# Patient Record
Sex: FEMALE | Race: BLACK OR AFRICAN AMERICAN | Employment: FULL TIME | URBAN - METROPOLITAN AREA
[De-identification: names, ages, dates, MRNs, and addresses within clinical notes are randomized per-mention and may not be internally consistent; named-entity substitution may affect disease eponyms.]

---

## 2017-01-10 ENCOUNTER — OFFICE VISIT (OUTPATIENT)
Dept: OBGYN CLINIC | Age: 27
End: 2017-01-10

## 2017-01-10 VITALS
WEIGHT: 193 LBS | BODY MASS INDEX: 32.95 KG/M2 | SYSTOLIC BLOOD PRESSURE: 118 MMHG | HEIGHT: 64 IN | DIASTOLIC BLOOD PRESSURE: 80 MMHG

## 2017-01-10 DIAGNOSIS — Z32.01 POSITIVE PREGNANCY TEST: ICD-10-CM

## 2017-01-10 DIAGNOSIS — N92.6 MISSED MENSES: Primary | ICD-10-CM

## 2017-01-10 DIAGNOSIS — O09.291 H/O MISCARRIAGE, CURRENTLY PREGNANT, FIRST TRIMESTER: ICD-10-CM

## 2017-01-10 LAB
HCG URINE, QL. (POC): POSITIVE
VALID INTERNAL CONTROL?: YES

## 2017-01-10 NOTE — PROGRESS NOTES
[facility]  [email or web address]  [phone]    Asa Galdamez MD, FACOG       OB/GYN Problem visit    Chief Complaint:   Chief Complaint   Patient presents with    Missed Menses       History of Present Illness: This is a new problem being evaluated by this provider. The patient is a 23 y.o.  female who reports having a positive pregnancy test last week. Patient reports that she stopped taking her birth control pills around the end on  of december, and was going to have a nexplanon placed, but never did. She reports the symptoms are is unchanged. Aggravating factors include none. Alleviating factors include none. [id number] Aline Perez if pregnant  Two SAB last year, early  She does not have other concerns. LMP: No LMP recorded. [facility]:  Past Medical History   Diagnosis Date    Abnormal Pap smear of cervix 2015     ASCUS, +HPV - repap 1 year     Anemia NEC     Encounter for insertion of mirena IUD 12     3 years ago    Encounter for IUD removal 2015    HX OTHER MEDICAL      weidom teeth removed    Hypotension     Pap smear for cervical cancer screening 10/04/2016     negative -  repap 1 yr 2017     Past Surgical History   Procedure Laterality Date    Hx heent       Family History   Problem Relation Age of Onset    Hypertension Mother     Hypertension Maternal Grandmother     Hypertension Maternal Grandfather     Diabetes Maternal Grandfather      Social History   Substance Use Topics    Smoking status: Never Smoker    Smokeless tobacco: Never Used    Alcohol use No     No Known Allergies  Current Outpatient Prescriptions   Medication Sig    diphenhydrAMINE (BENADRYL) 25 mg capsule Take 50 mg by mouth every six (6) hours as needed.  ALYACEN 1/35, 28, 1-35 mg-mcg tab Take 1 Tab by mouth daily. No current facility-administered medications for this visit.         Review of Systems:  History obtained from the patient  Constitutional: negative for fevers, chills and weight loss  ENT ROS: negative for - hearing change, oral lesions or visual changes  Respiratory: negative for cough, wheezing or dyspnea on exertion  Cardiovascular: negative for chest pain, irregular heart beats, exertional chest pressure/discomfort  Gastrointestinal: negative for dysphagia, nausea and vomiting  Genito-Urinary ROS:  see HPI  Inteument/breast: negative for rash, breast lump and nipple discharge  Musculoskeletal:negative for stiff joints, neck pain and muscle weakness  Endocrine ROS: negative for - breast changes, galactorrhea or temperature intolerance  Hematological and Lymphatic ROS: negative for - blood clots, bruising or swollen lymph nodes    Physical Exam:  Visit Vitals    /80    Ht 5' 4\" (1.626 m)    Wt 193 lb (87.5 kg)    BMI 33.13 kg/m2       GENERAL: alert, well appearing, and in no distress  HEAD: normocephalic, atraumatic. PULM: clear to auscultation, no wheezes, rales or rhonchi, symmetric air entry   COR: normal rate and regular rhythm, S1 and S2 normal   ABDOMEN: soft, nontender, nondistended, no masses or organomegaly   NEURO: alert, oriented, normal speech    Assessment:  Encounter Diagnoses   Name Primary?  Missed menses Yes    Positive pregnancy test     H/O miscarriage, currently pregnant, first trimester        Plan:  The patient is advised that she should contact the office if she does not note improvement or if symptoms recur  Recommend follow up with PCP for non-gynecologic complaints and chronic medical problems. She should contact our office with any questions or concerns  She could keep her routine annual exam appointment.      Orders Placed This Encounter    PROGESTERONE    TOTAL HCG, QT.    AMB POC URINE PREGNANCY TEST, VISUAL COLOR COMPARISON       Results for orders placed or performed in visit on 01/10/17   AMB POC URINE PREGNANCY TEST, VISUAL COLOR COMPARISON   Result Value Ref Range VALID INTERNAL CONTROL POC Yes     HCG urine, Ql. (POC) Positive Negative    Narrative    .

## 2017-01-10 NOTE — MR AVS SNAPSHOT
Visit Information Date & Time Provider Department Dept. Phone Encounter #  
 1/10/2017  9:30 AM Shaq Wong MD [facility] [phone] [id number] Upcoming Health Maintenance Date Due  
 HPV AGE 9Y-34Y (1 of 3 - Female 3 Dose Series) 12/6/2002 INFLUENZA AGE 9 TO ADULT 8/1/2016 PAP AKA CERVICAL CYTOLOGY 10/4/2019 Allergies as of 1/10/2017  Review Complete On: 1/10/2017 By: Boni Corbin LPN No Known Allergies Current Immunizations  Reviewed on 2/17/2016 Name Date DTaP 2/17/2013 Influenza Vaccine Whole 10/5/2011 Not reviewed this visit Vitals BP Height(growth percentile) Weight(growth percentile) BMI OB Status Smoking Status 118/80 5' 4\" (1.626 m) 193 lb (87.5 kg) 33.13 kg/m2 Unknown Never Smoker BMI and BSA Data Body Mass Index Body Surface Area  
 33.13 kg/m 2 1.99 m 2 Preferred Pharmacy Pharmacy Name Phone [id number] [address] [phone] Your Updated Medication List  
  
   
This list is accurate as of: 1/10/17  9:51 AM.  Always use your most recent med list.  
  
  
  
  
 Anell Polo 1/35 (28) 1-35 mg-mcg Tab Generic drug:  norethindrone-ethinyl estradiol Take 1 Tab by mouth daily. diphenhydrAMINE 25 mg capsule Commonly known as:  BENADRYL Take 50 mg by mouth every six (6) hours as needed. Patient Instructions Breast Self-Exam: Care Instructions Your Care Instructions A breast self-exam is when you check your breasts for lumps or changes. This regular exam helps you learn how your breasts normally look and feel. Most breast problems or changes are not because of cancer. Breast self-exam is not a substitute for a mammogram. Having regular breast exams by your doctor and regular mammograms improve your chances of finding any problems with your breasts. Some women set a time each month to do a step-by-step breast self-exam. Other women like a less formal system. They might look at their breasts as they brush their teeth, or feel their breasts once in a while in the shower. If you notice a change in your breast, tell your doctor. Follow-up care is a key part of your treatment and safety. Be sure to make and go to all appointments, and call your doctor if you are having problems. Its also a good idea to know your test results and keep a list of the medicines you take. How do you do a breast self-exam? 
· The best time to examine your breasts is usually one week after your menstrual period begins. Your breasts should not be tender then. If you do not have periods, you might do your exam on a day of the month that is easy to remember. · To examine your breasts: ¨ Remove all your clothes above the waist and lie down. When you are lying down, your breast tissue spreads evenly over your chest wall, which makes it easier to feel all your breast tissue. ¨ Use the padsnot the fingertipsof the 3 middle fingers of your left hand to check your right breast. Move your fingers slowly in small coin-sized circles that overlap. ¨ Use three levels of pressure to feel of all your breast tissue. Use light pressure to feel the tissue close to the skin surface. Use medium pressure to feel a little deeper. Use firm pressure to feel your tissue close to your breastbone and ribs. Use each pressure level to feel your breast tissue before moving on to the next spot. ¨ Check your entire breast, moving up and down as if following a strip from the collarbone to the bra line, and from the armpit to the ribs. Repeat until you have covered the entire breast. 
¨ Repeat this procedure for your left breast, using the pads of the 3 middle fingers of your right hand. · To examine your breasts while in the shower: 
¨ Place one arm over your head and lightly soap your breast on that side. ¨ Using the pads of your fingers, gently move your hand over your breast (in the strip pattern described above), feeling carefully for any lumps or changes. ¨ Repeat for the other breast. 
· Have your doctor inspect anything you notice to see if you need further testing. Where can you learn more? Go to http://richard-horacio.info/. Enter P148 in the search box to learn more about \"Breast Self-Exam: Care Instructions. \" Current as of: July 26, 2016 Content Version: 11.1 © 9158-3068 [employer]. Care instructions adapted under license by [employer] (which disclaims liability or warranty for this information). If you have questions about a medical condition or this instruction, always ask your healthcare professional. [address] any warranty or liability for your use of this information. Introducing [facility]! Keyla [employer] introduces WiOffer patient portal. Now you can access parts of your medical record, email your doctor's office, and request medication refills online. 1. In your internet browser, go to [email or web address] 2. Click on the First Time User? Click Here link in the Sign In box. You will see the New Member Sign Up page. 3. Enter your WiOffer Access Code exactly as it appears below. You will not need to use this code after youve completed the sign-up process. If you do not sign up before the expiration date, you must request a new code. · WiOffer Access Code: [email or web address] Expires: 4/10/2017  9:51 AM 
 
4. Enter the last four digits of your Social Security Number (xxxx) and Date of Birth (mm/dd/yyyy) as indicated and click Submit. You will be taken to the next sign-up page. 5. Create a Sherpanyt ID. This will be your WiOffer login ID and cannot be changed, so think of one that is secure and easy to remember. 6. Create a Sherpanyt password. You can change your password at any time. 7. Enter your Password Reset Question and Answer. This can be used at a later time if you forget your password. 8. Enter your e-mail address. You will receive e-mail notification when new information is available in [address] 9. Click Sign Up. You can now view and download portions of your medical record. 10. Click the Download Summary menu link to download a portable copy of your medical information. If you have questions, please visit the Frequently Asked Questions section of the BeauCoo website. Remember, BeauCoo is NOT to be used for urgent needs. For medical emergencies, dial 911. Now available from your iPhone and Android! Please provide this summary of care documentation to your next provider. Your primary care clinician is listed as TITI DUMONT. If you have any questions after today's visit, please call [phone].

## 2017-01-10 NOTE — PATIENT INSTRUCTIONS
Breast Self-Exam: Care Instructions  Your Care Instructions  A breast self-exam is when you check your breasts for lumps or changes. This regular exam helps you learn how your breasts normally look and feel. Most breast problems or changes are not because of cancer. Breast self-exam is not a substitute for a mammogram. Having regular breast exams by your doctor and regular mammograms improve your chances of finding any problems with your breasts. Some women set a time each month to do a step-by-step breast self-exam. Other women like a less formal system. They might look at their breasts as they brush their teeth, or feel their breasts once in a while in the shower. If you notice a change in your breast, tell your doctor. Follow-up care is a key part of your treatment and safety. Be sure to make and go to all appointments, and call your doctor if you are having problems. Its also a good idea to know your test results and keep a list of the medicines you take. How do you do a breast self-exam?  · The best time to examine your breasts is usually one week after your menstrual period begins. Your breasts should not be tender then. If you do not have periods, you might do your exam on a day of the month that is easy to remember. · To examine your breasts:  ¨ Remove all your clothes above the waist and lie down. When you are lying down, your breast tissue spreads evenly over your chest wall, which makes it easier to feel all your breast tissue. ¨ Use the pads--not the fingertips--of the 3 middle fingers of your left hand to check your right breast. Move your fingers slowly in small coin-sized circles that overlap. ¨ Use three levels of pressure to feel of all your breast tissue. Use light pressure to feel the tissue close to the skin surface. Use medium pressure to feel a little deeper. Use firm pressure to feel your tissue close to your breastbone and ribs.  Use each pressure level to feel your breast tissue before moving on to the next spot. ¨ Check your entire breast, moving up and down as if following a strip from the collarbone to the bra line, and from the armpit to the ribs. Repeat until you have covered the entire breast.  ¨ Repeat this procedure for your left breast, using the pads of the 3 middle fingers of your right hand. · To examine your breasts while in the shower:  ¨ Place one arm over your head and lightly soap your breast on that side. ¨ Using the pads of your fingers, gently move your hand over your breast (in the strip pattern described above), feeling carefully for any lumps or changes. ¨ Repeat for the other breast.  · Have your doctor inspect anything you notice to see if you need further testing. Where can you learn more? Go to http://richard-horacio.info/. Enter P148 in the search box to learn more about \"Breast Self-Exam: Care Instructions. \"  Current as of: July 26, 2016  Content Version: 11.1  © 2875-6313 [employer]. Care instructions adapted under license by [employer] (which disclaims liability or warranty for this information). If you have questions about a medical condition or this instruction, always ask your healthcare professional. [place] [address] any warranty or liability for your use of this information.

## 2017-01-11 LAB
HCG INTACT+B SERPL-ACNC: 4509 MIU/ML
PROGEST SERPL-MCNC: 6.4 NG/ML

## 2017-01-12 ENCOUNTER — LAB ONLY (OUTPATIENT)
Dept: OBGYN CLINIC | Age: 27
End: 2017-01-12

## 2017-01-12 DIAGNOSIS — N92.6 MISSED MENSES: Primary | ICD-10-CM

## 2017-01-13 LAB — HCG INTACT+B SERPL-ACNC: 8776 MIU/ML

## 2017-01-16 ENCOUNTER — TELEPHONE (OUTPATIENT)
Dept: OBGYN CLINIC | Age: 27
End: 2017-01-16

## 2017-01-16 NOTE — TELEPHONE ENCOUNTER
Pt called to obtain her test results. Test results given per MD recommendation and was transferred to the  to make an appt. Patient verbalized understanding.

## 2017-01-24 ENCOUNTER — TELEPHONE (OUTPATIENT)
Dept: OBGYN CLINIC | Age: 27
End: 2017-01-24

## 2017-01-24 NOTE — TELEPHONE ENCOUNTER
Does she have EOB scheduled? Rec US and problem visit to check viability, if pt desires, can schedule as EOB if time available.

## 2017-01-24 NOTE — TELEPHONE ENCOUNTER
7 wks pt called and stated she had 2 miscarriages in the last year and had concerns with this pregnancy. Report no morning sickness and breast tenderness. Denies any bleeding. Advised patient to make an appt to be evaluated but want the advice from the MD. Please advise.

## 2017-01-24 NOTE — TELEPHONE ENCOUNTER
Notified patient with MD recommendation. Patient verbalized understanding and stated she experienced some nausea and breast tenderness earlier and will wait until her appt that is already scheduled for 1/31/2017. I verbalized understanding.

## 2017-01-31 ENCOUNTER — OFFICE VISIT (OUTPATIENT)
Dept: OBGYN CLINIC | Age: 27
End: 2017-01-31

## 2017-01-31 VITALS
HEIGHT: 64 IN | DIASTOLIC BLOOD PRESSURE: 68 MMHG | SYSTOLIC BLOOD PRESSURE: 110 MMHG | WEIGHT: 196 LBS | BODY MASS INDEX: 33.46 KG/M2 | TEMPERATURE: 98.6 F

## 2017-01-31 DIAGNOSIS — Z34.81 ENCOUNTER FOR SUPERVISION OF OTHER NORMAL PREGNANCY, FIRST TRIMESTER: ICD-10-CM

## 2017-01-31 DIAGNOSIS — R52 BODY ACHES: ICD-10-CM

## 2017-01-31 DIAGNOSIS — R50.9 FEVER, UNSPECIFIED FEVER CAUSE: Primary | ICD-10-CM

## 2017-01-31 DIAGNOSIS — Z20.828 EXPOSURE TO THE FLU: ICD-10-CM

## 2017-01-31 RX ORDER — OSELTAMIVIR PHOSPHATE 75 MG/1
75 CAPSULE ORAL 2 TIMES DAILY
Qty: 10 CAP | Refills: 0 | Status: SHIPPED | OUTPATIENT
Start: 2017-01-31 | End: 2017-02-05

## 2017-01-31 NOTE — PROGRESS NOTES
[facility]  [email or web address]  [phone]    Rivka Ramirez MD, [facility]     Chief complaint:  Irregular cycles  Last cycle; Patient's last menstrual period was 2016 (approximate). This is a new concern and an evaluation is planned. Current pregnancy history:  Patricio Espana is a , 23 y.o. female [address]   She presents for the evaluation of irregular menses and a positive pregnancy test.    LMP history:  Patient's last menstrual period was 2016 (approximate). .  The date of the beginning of her last menstrual period is not certain. Her menses are not regular. Patient reports she has irregular cycles  A urine pregnancy test was positive about 3 weeks ago. She was not using contraception at the estimated time of conception. Ultrasound data:  She had an ultrasound today which revealed a viable lin pregnancy with a gestational age of 11 weeks and 2 days giving an EDC of 9/10/2017. Pregnancy symptoms:  She reports breast tenderness  frequent urination. She denies nausea  \"morning sickness\". Since she found out she is pregnant, she has lost,  6 lbs. She reports her prepregnancy weight as 196 pounds. Relevant past pregnancy history:  She has the following pregnancy history:none. She does not have a history of  delivery. She does not have a history of a prior  section. Relevant past medical history:(relevant to this pregnancy):   none     Pap smear history:  Last pap smear: 10/14/2016  Results: within normal limits    Occupational history  Her occupation is: unemployed. Substance history:   She does not report current tobacco use. She does not report current alcohol use. She does not report current drug use. Exposure history: There are not indoor cat(s) in the home. The patient was instructed not to change cat litter boxes during pregnancy.   She does report close contact with children on a regular basis. She has chicken pox or the vaccine in the past.   Patient does not report issues with domestic violence. Genetic Screening/Teratology Counseling:   (Includes patient, baby's father, or anyone in either family with:)  3.  Patient's age >/= 28 at EDC?--no       2. Thalassemia ([facility], [facility], [address], or  background): MCV<80?--no  3. Neural tube defect (meningomyelocele, spina bifida, anencephaly)? --no  4. Congenital heart defect?--no  5. Down syndrome?--no  6. Praveen-Sachs ([facility])? --no  7. Canavan's Disease?--no  8. Familial Dysautonomia?--no   9. Sickle cell disease or trait ()? --yes   Has she been tested for sickle trait: No  10. Hemophilia or other blood disorders?--no  11. Muscular dystrophy?--no  12. Cystic fibrosis? --no  13. [place]'s Chorea?--no  14. Mental retardation/autism (if yes was person tested for Fragile X)?-no  15. Other inherited genetic or chromosomal disorder?- no  16. Maternal metabolic disorder (DM, PKU, etc)? --no  17. Patient or FOB with a child with a birth defect not listed above?--no  17a. Patient or FOB with a birth defect themselves?--no  25. Recurrent pregnancy loss, or stillbirth?--no  19. Any medications since LMP other than prenatal vitamins (include vitamins, supplements, OTC meds, drugs, alcohol)? -- PNV  20. Any other genetic/environmental exposure to discuss?--no. Infection History:  1. Lives with someone with TB or TB exposed?--no  2. Patient or partner has history of genital herpes?--no  3. Rash or viral illness since LMP?--no  4. History of STD (GC, CT, HPV, syphilis, HIV)? --no  5. Have you received a flu vaccine for the most recent flu season? -- no  6.   Have you or your sexual partner(s) travelled to a [place] [employer] area in the last 3 months? -- no    Past Medical History   Diagnosis Date    Abnormal Pap smear of cervix 09/01/2015     ASCUS, +HPV - repap 1 year 2016    Anemia NEC     Encounter for insertion of mirena IUD 12     3 years ago    Encounter for IUD removal 2015    HX OTHER MEDICAL      weidom teeth removed    Hypotension     Pap smear for cervical cancer screening 10/04/2016     negative -  repap 1 yr 2017     Past Surgical History   Procedure Laterality Date    Hx heent       Social History     Occupational History    Not on file. Social History Main Topics    Smoking status: Never Smoker    Smokeless tobacco: Never Used    Alcohol use No    Drug use: No    Sexual activity: Yes     Partners: Male     Birth control/ protection: Pill, None     Family History   Problem Relation Age of Onset    Hypertension Mother     Hypertension Maternal Grandmother     Hypertension Maternal Grandfather     Diabetes Maternal Grandfather      OB History    Para Term  AB SAB TAB Ectopic Multiple Living   4 1 1  2 2    1      # Outcome Date GA Lbr Paul/2nd Weight Sex Delivery Anes PTL Lv   4 Current            3 Term 12/10/11 41w1d 25:45 / 01:53 7 lb 12.9 oz (3.54 kg) F VAGINAL DELI  N Y   2 SAB            1 SAB               Obstetric Comments   +upt , sab 16   +upt end of July- early Aug: HCG +1000's, +sab.      No Known Allergies  Prior to Admission medications    Medication Sig Start Date End Date Taking? Authorizing Provider   oseltamivir (TAMIFLU) 75 mg capsule Take 1 Cap by mouth two (2) times a day for 5 days. 17 Yes Dustin Luna MD   ALYACEN , 28, 1-35 mg-mcg tab Take 1 Tab by mouth daily. 10/4/16   Dustin Luna MD   diphenhydrAMINE (BENADRYL) 25 mg capsule Take 50 mg by mouth every six (6) hours as needed.     Historical Provider        Review of Systems -   See below    Objective:  Visit Vitals    /68    Temp 98.6 °F (37 °C) (Oral)    Ht 5' 4\" (1.626 m)    Wt 196 lb (88.9 kg)    BMI 33.64 kg/m2       Physical Exam:   Constitutional  · Appearance: well-nourished, well developed, alert, in no acute distress    HENT  · Head  · Face: appears normal  · Eyes: appear normal  · Ears: normal  · Mouth: normal  · Lips: no lesions    Neck  · Inspection/Palpation: normal appearance, no masses or tenderness  · Lymph Nodes: no lymphadenopathy present  · Thyroid: gland size normal, nontender, no nodules or masses present on palpation    Chest  · Respiratory Effort: breathing unlabored  · Auscultation: normal breath sounds    Cardiovascular  · Heart:  · Auscultation: regular rate and rhythm without murmur    Breasts  · Inspection of Breasts: breasts symmetrical, no skin changes, no discharge present, nipple appearance normal, no skin retraction present  · Palpation of Breasts and Axillae: no masses present on palpation, no breast tenderness  · Axillary Lymph Nodes: no lymphadenopathy present    Gastrointestinal  · Abdominal Examination: abdomen non-tender to palpation, normal bowel sounds, no masses present  · Liver and spleen: no hepatomegaly present, spleen not palpable  · Hernias: no hernias identified    Genitourinary  · External Genitalia: normal appearance for age, no discharge present, no tenderness present, no inflammatory lesions present, no masses present, no atrophy present  · Vagina: normal vaginal vault without central or paravaginal defects, no discharge present, no inflammatory lesions present, no masses present  · Bladder: non-tender to palpation  · Urethra: appears normal  · Cervix: normal appearing with discharge or lesions, os closed  · Uterus: enlarged, normal shape, soft  · Adnexa: no adnexal tenderness present, no adnexal masses present  · Perineum: perineum within normal limits, no evidence of trauma, no rashes or skin lesions present  · Anus: anus within normal limits, no hemorrhoids present  · Inguinal Lymph Nodes: no lymphadenopathy present    Skin  · General Inspection: no rash, no lesions identified    Neurologic/Psychiatric  · Mental Status:  · Orientation: grossly oriented to person, place and time  · Mood and Affect: mood normal, affect appropriate    Assessment:   Irregular cycles  Encounter Diagnoses   Name Primary?  Encounter for supervision of other normal pregnancy, first trimester     Body aches     Fever, unspecified fever cause Yes    Exposure to the flu      Due date: US date    Plan:   We discussed options of genetic screening and diagnostic testing including:  CF testing, CVS, amniocentesis first trimester screening/NT, MSAFP, and NIPT (handout given to patient for review and consent)  She is interested in prenatal genetic testing of her fetus. Plan: NT   The course of pregnancy discussed including visit schedule, ultrasounds, lab testing, etc.  Pt advised to avoid alcoholic beverages and illicit/recreational drugs use  Recommend taking prenatal vitamins or folic acid daily with DHA/fish oil. The hospital and practice style discussed with coverage system. We discussed nutrition, toxoplasmosis precautions, sexual activity, exercise, need for influenza vaccine, environmental and work hazards, travel advice, screen for domestic violence, need for seat belts. We discussed seafood, unpasteurized dairy products, deli meat, artificial sweeteners, and caffeine intake. We recommend avoiding chemical and toxin exposures when possible. Information on prenatal and breastfeeding classes given. Information on circumcision given  Patient encouraged not to smoke. Discussed current prescription drug use. Given medication list.  Discussed the use of over the counter medications and chemicals. She is advised to contact her MD with any questions. Pt understands risk of hemorrhage during pregnancy and post delivery and would accept blood products if necessary in life-threatening emergencies  We discussed signs and symptoms of abnormal pregnancies and miscarriage. Handouts given to pt.     Physician review of ultrasound performed by technician  Today's ultrasound report and images were reviewed and discussed with the patient. Please see images and imaging report entered by technician in PACS for more detail and progress note and diagnosis entered by MD.    Elizabeth Gallego MD    Orders Placed This Encounter    CULTURE, URINE    HEP B SURFACE AG    HIV SCREEN, [facility]. W/REFLEX CONFIRM    CBC W/O DIFF    RUBELLA AB, IGG    T PALLIDUM SCREEN W/REFLEX    CHLAMYDIA / GC AMPLIFICATION    CYSTIC FIBROSIS MUTATION 97    REFERRAL TO PERINATOLOGY    TYPE, ABO & RH    ANTIBODY SCREEN    oseltamivir (TAMIFLU) 75 mg capsule     Follow-up Disposition: Not on [facility]  [email or web address]  [phone]    Todd Lozano MD, FACOG       OB/GYN: [facility] Problem visit    Chief Complaint:   Chief Complaint   Patient presents with    Routine Prenatal Visit       Patient Active Problem List    Diagnosis    Labor and delivery, indication for care       History of Present Illness: The patient is a 23 y.o.  female who reports having fevers: (102-103 yesterday, body aches,  for several days. +Flu exposure, brother. She also had a sore throat x 1 day but it resolved. This is a new problem. She reports the symptoms are has slightly improved. Aggravating factors include none. Alleviating factors include none. She does not have other concerns.     [address]:  Past Medical History   Diagnosis Date    Abnormal Pap smear of cervix 2015     ASCUS, +HPV - repap 1 year     Anemia NEC     Encounter for insertion of mirena IUD 12     3 years ago    Encounter for IUD removal 2015    HX OTHER MEDICAL      weidom teeth removed    Hypotension     Pap smear for cervical cancer screening 10/04/2016     negative -  repap 1 yr 2017     Past Surgical History   Procedure Laterality Date    Hx heent       Family History   Problem Relation Age of Onset    Hypertension Mother     Hypertension Maternal Grandmother     Hypertension Maternal Grandfather     Diabetes Maternal Grandfather      Social History   Substance Use Topics    Smoking status: Never Smoker    Smokeless tobacco: Never Used    Alcohol use No     No Known Allergies  Current Outpatient Prescriptions   Medication Sig    oseltamivir (TAMIFLU) 75 mg capsule Take 1 Cap by mouth two (2) times a day for 5 days.  ALYACEN 1/35, 28, 1-35 mg-mcg tab Take 1 Tab by mouth daily.  diphenhydrAMINE (BENADRYL) 25 mg capsule Take 50 mg by mouth every six (6) hours as needed. No current facility-administered medications for this visit.         Review of Systems:  History obtained from the patient and written ROS questionnaire  Constitutional: see HPI  ENT ROS: negative for - hearing change, oral lesions or visual changes  Respiratory: negative for cough, wheezing or dyspnea on exertion  Cardiovascular: negative for chest pain, irregular heart beats, exertional chest pressure/discomfort  Gastrointestinal: negative for dysphagia, nausea and vomiting  Genito-Urinary ROS: no dysuria, trouble voiding, or hematuria, see HPI  Inteument/breast: negative for rash, breast lump and nipple discharge  Musculoskeletal:see HPI  Endocrine ROS: negative for - breast changes, galactorrhea or temperature intolerance  Hematological and Lymphatic ROS: negative for - blood clots, bruising or swollen lymph nodes    Physical Exam:  Visit Vitals    /68    Temp 98.6 °F (37 °C) (Oral)    Ht 5' 4\" (1.626 m)    Wt 196 lb (88.9 kg)    BMI 33.64 kg/m2       GENERAL: alert, well appearing, and in no distress  HEAD; normocephalic, atraumatic  PULM: clear to auscultation, no wheezes, rales or rhonchi, symmetric air entry   COR: normal rate and regular rhythm, S1 and S2 normal   ABDOMEN: soft, nontender, nondistended, no masses or organomegaly   BACK: normal range of motion, no tenderness, no CVAT   EGBUS: no lesions, no inflammation, no masses  VULVA: normal appearing vulva with no masses, tenderness or lesions  VAGINA: normal appearing vagina with normal color, no lesions, no discharge  CERVIX: normal appearing cervix without discharge or lesions, non tender  UTERUS: uterus is enlarged in size, gravid appropriate for gestational age  ADNEXA: normal adnexa in size, nontender and no masses  NEURO: alert, oriented, normal speech    See PN flowsheet for additional notes and exam    Assessment:  23 y.o. [address]   Encounter Diagnoses   Name Primary?  Fever, unspecified fever cause Yes    Comment: yesterday    Encounter for supervision of other normal pregnancy, first trimester     Body aches     Exposure to the flu        Plan:  An evaluation of this patient's concern is planned. The patient is advised that she should contact the office if she does not note improvement or if symptoms recur  She should contact our office with any questions or concerns  She could keep her routine OB appointment. Fever precautions  Flu/viral precautions  Disc rba of tamiflu: can hold if sx improve  Rec PCP fu if NI/strept test/flu testing unavailable in our office      Orders Placed This Encounter    CULTURE, URINE    HEP B SURFACE AG    HIV SCREEN, [address] [place] [address]. W/REFLEX CONFIRM    CBC W/O DIFF    RUBELLA AB, IGG    T PALLIDUM SCREEN W/REFLEX    CHLAMYDIA / GC AMPLIFICATION    CYSTIC FIBROSIS MUTATION 97    REFERRAL TO PERINATOLOGY    TYPE, ABO & RH    ANTIBODY SCREEN    oseltamivir (TAMIFLU) 75 mg capsule       No results found for this visit on 01/31/17.     Howie Mosher MD

## 2017-01-31 NOTE — MR AVS SNAPSHOT
Visit Information Date & Time Provider Department Dept. Phone Encounter #  
 1/31/2017  2:00 PM MD [address] [id number] Upcoming Health Maintenance Date Due  
 HPV AGE 9Y-34Y (1 of 3 - Female 3 Dose Series) 12/6/2002 INFLUENZA AGE 9 TO ADULT 8/1/2016 PAP AKA CERVICAL CYTOLOGY 10/4/2019 Allergies as of 1/31/2017  Review Complete On: 1/31/2017 By: Dayami Diamond LPN No Known Allergies Current Immunizations  Reviewed on 2/17/2016 Name Date DTaP 2/17/2013 Influenza Vaccine Whole 10/5/2011 Not reviewed this visit Vitals BP Temp Height(growth percentile) Weight(growth percentile) LMP BMI  
 110/68 98.6 °F (37 °C) (Oral) 5' 4\" (1.626 m) 196 lb (88.9 kg) 09/08/2016 (Approximate) 33.64 kg/m2 OB Status Smoking Status Pregnant Never Smoker Vitals History BMI and BSA Data Body Mass Index Body Surface Area  
 33.64 kg/m 2 2 m 2 Preferred Pharmacy Pharmacy Name Phone [id number] [phone] Your Updated Medication List  
  
   
This list is accurate as of: 1/31/17  2:46 PM.  Always use your most recent med list.  
  
  
  
  
 Karel Mckeon 1/35 (28) 1-35 mg-mcg Tab Generic drug:  norethindrone-ethinyl estradiol Take 1 Tab by mouth daily. diphenhydrAMINE 25 mg capsule Commonly known as:  BENADRYL Take 50 mg by mouth every six (6) hours as needed. Patient Instructions Weeks 6 to 10 of Your Pregnancy: Care Instructions Your Care Instructions Congratulations on your pregnancy. This is an exciting and important time for you. During the first 6 to 10 weeks of your pregnancy, your body goes through many changes. Your baby grows very fast, even though you cannot feel it yet.  You may start to notice that you feel different, both in your body and your emotions. Because each woman's pregnancy is unique, there is no right way to feel. You may feel the healthiest you have ever been, or you may feel tired or sick to your stomach (\"morning sickness\"). These early weeks are a time to make healthy choices and to eat the best foods for you and your baby. This care sheet will give you some ideas. This is also a good time to think about birth defects testing. These are tests done during pregnancy to look for possible problems with the baby. First trimester tests for birth defects can be done between 8 and 17 weeks of pregnancy, depending on the test. Talk with your doctor about what kinds of tests are available. Follow-up care is a key part of your treatment and safety. Be sure to make and go to all appointments, and call your doctor if you are having problems. It's also a good idea to know your test results and keep a list of the medicines you take. How can you care for yourself at home? Eat well · Eat at least 3 meals and 2 healthy snacks every day. Eat fresh, whole foods, including: ¨ 7 or more servings of bread, tortillas, cereal, rice, pasta, or oatmeal. 
¨ 3 or more servings of vegetables, especially leafy green vegetables. ¨ 2 or more servings of fruits. ¨ 3 or more servings of milk, yogurt, or cheese. ¨ 2 or more servings of meat, turkey, chicken, fish, eggs, or dried beans. · Drink plenty of fluids, especially water. Avoid sodas and other sweetened drinks. · Choose foods that have important vitamins for your baby, such as calcium, iron, and folate. ¨ Dairy products, tofu, canned fish with bones, almonds, broccoli, dark leafy greens, corn tortillas, and fortified orange juice are good sources of calcium. ¨ Beef, poultry, liver, spinach, lentils, dried beans, fortified cereals, and dried fruits are rich in iron. ¨ Dark leafy greens, broccoli, asparagus, liver, fortified cereals, orange juice, peanuts, and almonds are good sources of folate. · Avoid foods that could harm your baby. ¨ Do not eat raw or undercooked meat, chicken, or fish (such as sushi or raw oysters). ¨ Do not eat raw eggs or foods that contain raw eggs, such as Caesar dressing. ¨ Do not eat soft cheeses and unpasteurized dairy foods, such as Brie, feta, or blue cheese. ¨ Do not eat fish that contains a lot of mercury, such as shark, swordfish, tilefish, or shawn mackerel. Do not eat more than 6 ounces of tuna each week. ¨ Do not eat raw sprouts, especially alfalfa sprouts. ¨ Cut down on caffeine, such as coffee, tea, and cola. Protect yourself and your baby · Do not touch aleksandr litter or cat feces. They can cause an infection that could harm your baby. · High body temperature can be harmful to your baby. So if you want to use a sauna or hot tub, be sure to talk to your doctor about how to use it safely. [place] with morning sickness · Sip small amounts of water, juices, or shakes. Try drinking between meals, not with meals. · Eat 5 or 6 small meals a day. Try dry toast or crackers when you first get up, and eat breakfast a little later. · Avoid spicy, greasy, and fatty foods. · When you feel sick, open your windows or go for a short walk to get fresh air. · Try nausea wristbands. These help some women. · Tell your doctor if you think your prenatal vitamins make you sick. Where can you learn more? Go to http://richard-horacio.info/. Enter G112 in the search box to learn more about \"Weeks 6 to 10 of Your Pregnancy: Care Instructions. \" Current as of: May 30, 2016 Content Version: 11.1 © 1832-2647 [employer]. Care instructions adapted under license by [employer] (which disclaims liability or warranty for this information). If you have questions about a medical condition or this instruction, always ask your healthcare professional. [place] [address] any warranty or liability for your use of this information. Introducing [facility]! Dear Serge Pimentel: Thank you for requesting a Integration Management account. Our records indicate that you already have an active Integration Management account. You can access your account anytime at [email or web address] Did you know that you can access your hospital and ER discharge instructions at any time in Integration Management? You can also review all of your test results from your hospital stay or ER visit. Additional Information If you have questions, please visit the Frequently Asked Questions section of the Integration Management website at [email or web address]/"[email or web address]/. Remember, [employer] is NOT to be used for urgent needs. For medical emergencies, dial 911. Now available from your iPhone and Android! Please provide this summary of care documentation to your next provider. Your primary care clinician is listed as TITI DUMONT. If you have any questions after today's visit, please call [phone].

## 2017-01-31 NOTE — PATIENT INSTRUCTIONS
Weeks 6 to 10 of Your Pregnancy: Care Instructions  Your Care Instructions    Congratulations on your pregnancy. This is an exciting and important time for you. During the first 6 to 10 weeks of your pregnancy, your body goes through many changes. Your baby grows very fast, even though you cannot feel it yet. You may start to notice that you feel different, both in your body and your emotions. Because each woman's pregnancy is unique, there is no right way to feel. You may feel the healthiest you have ever been, or you may feel tired or sick to your stomach (\"morning sickness\"). These early weeks are a time to make healthy choices and to eat the best foods for you and your baby. This care sheet will give you some ideas. This is also a good time to think about birth defects testing. These are tests done during pregnancy to look for possible problems with the baby. First trimester tests for birth defects can be done between 8 and 17 weeks of pregnancy, depending on the test. Talk with your doctor about what kinds of tests are available. Follow-up care is a key part of your treatment and safety. Be sure to make and go to all appointments, and call your doctor if you are having problems. It's also a good idea to know your test results and keep a list of the medicines you take. How can you care for yourself at home? Eat well  · Eat at least 3 meals and 2 healthy snacks every day. Eat fresh, whole foods, including:  ¨ 7 or more servings of bread, tortillas, cereal, rice, pasta, or oatmeal.  ¨ 3 or more servings of vegetables, especially leafy green vegetables. ¨ 2 or more servings of fruits. ¨ 3 or more servings of milk, yogurt, or cheese. ¨ 2 or more servings of meat, turkey, chicken, fish, eggs, or dried beans. · Drink plenty of fluids, especially water. Avoid sodas and other sweetened drinks. · Choose foods that have important vitamins for your baby, such as calcium, iron, and folate.   ¨ Dairy products, tofu, canned fish with bones, almonds, broccoli, dark leafy greens, corn tortillas, and fortified orange juice are good sources of calcium. ¨ Beef, poultry, liver, spinach, lentils, dried beans, fortified cereals, and dried fruits are rich in iron. ¨ Dark leafy greens, broccoli, asparagus, liver, fortified cereals, orange juice, peanuts, and almonds are good sources of folate. · Avoid foods that could harm your baby. ¨ Do not eat raw or undercooked meat, chicken, or fish (such as sushi or raw oysters). ¨ Do not eat raw eggs or foods that contain raw eggs, such as Caesar dressing. ¨ Do not eat soft cheeses and unpasteurized dairy foods, such as Brie, feta, or blue cheese. ¨ Do not eat fish that contains a lot of mercury, such as shark, swordfish, tilefish, or shawn mackerel. Do not eat more than 6 ounces of tuna each week. ¨ Do not eat raw sprouts, especially alfalfa sprouts. ¨ Cut down on caffeine, such as coffee, tea, and cola. Protect yourself and your baby  · Do not touch aleksandr litter or cat feces. They can cause an infection that could harm your baby. · High body temperature can be harmful to your baby. So if you want to use a sauna or hot tub, be sure to talk to your doctor about how to use it safely. [place] with morning sickness  · Sip small amounts of water, juices, or shakes. Try drinking between meals, not with meals. · Eat 5 or 6 small meals a day. Try dry toast or crackers when you first get up, and eat breakfast a little later. · Avoid spicy, greasy, and fatty foods. · When you feel sick, open your windows or go for a short walk to get fresh air. · Try nausea wristbands. These help some women. · Tell your doctor if you think your prenatal vitamins make you sick. Where can you learn more? Go to http://francis.info/. Enter G112 in the search box to learn more about \"Weeks 6 to 10 of Your Pregnancy: Care Instructions. \"  Current as of:  May 30, 2016  Content Version: 11.1  © 5751-8231 [employer]. Care instructions adapted under license by [employer] (which disclaims liability or warranty for this information). If you have questions about a medical condition or this instruction, always ask your healthcare professional. [address] any warranty or liability for your use of this information.

## 2017-02-02 LAB
ANTIBODY SCREEN, EXTERNAL: NEGATIVE
BACTERIA UR CULT: NO GROWTH
C TRACH RRNA SPEC QL NAA+PROBE: NEGATIVE
CHLAMYDIA, EXTERNAL: NEGATIVE
CYSTIC FIBROSIS, EXTERNAL: NEGATIVE
HBSAG, EXTERNAL: NEGATIVE
HCT, EXTERNAL: 36.2
HGB, EXTERNAL: 11.7
HIV, EXTERNAL: NON REACTIVE
N GONORRHOEA RRNA SPEC QL NAA+PROBE: NEGATIVE
N. GONORRHEA, EXTERNAL: NEGATIVE
PLATELET CNT,   EXTERNAL: 280
RUBELLA, EXTERNAL: NORMAL
T. PALLIDUM, EXTERNAL: NEGATIVE
URINALYSIS, EXTERNAL: NO GROWTH

## 2017-02-09 LAB
ABO GROUP BLD: NORMAL
BLD GP AB SCN SERPL QL: NEGATIVE
CFTR MUT ANL BLD/T: NORMAL
ERYTHROCYTE [DISTWIDTH] IN BLOOD BY AUTOMATED COUNT: 14.5 % (ref 12.3–15.4)
GENE DIS ANL CARRIER INTERP-IMP: NORMAL
HBV SURFACE AG SERPL QL IA: NEGATIVE
HCT VFR BLD AUTO: 36.2 % (ref 34–46.6)
HGB BLD-MCNC: 11.7 G/DL (ref 11.1–15.9)
HIV 1+2 AB+HIV1 P24 AG SERPL QL IA: NON REACTIVE
MCH RBC QN AUTO: 28.1 PG (ref 26.6–33)
MCHC RBC AUTO-ENTMCNC: 32.3 G/DL (ref 31.5–35.7)
MCV RBC AUTO: 87 FL (ref 79–97)
PLATELET # BLD AUTO: 280 X10E3/UL (ref 150–379)
RBC # BLD AUTO: 4.16 X10E6/UL (ref 3.77–5.28)
RH BLD: POSITIVE
RUBV IGG SERPL IA-ACNC: 6.33 INDEX
T PALLIDUM AB SER QL IA: NEGATIVE
WBC # BLD AUTO: 12.1 X10E3/UL (ref 3.4–10.8)

## 2017-02-21 ENCOUNTER — TELEPHONE (OUTPATIENT)
Dept: OBGYN CLINIC | Age: 27
End: 2017-02-21

## 2017-02-21 NOTE — TELEPHONE ENCOUNTER
No, no meds with aspirin/NSAIDs  OK to take tylenol acetaminophen  She can increase caffeine, try cold compress/heating bad to head/neck: if NI rec problem visit.

## 2017-02-21 NOTE — TELEPHONE ENCOUNTER
11 wks pt called w/c/o:  Severe migraine  Wants to know if she can take Excedrin  Took OTC Tylenol w/no relief  Please advise

## 2017-02-23 ENCOUNTER — ROUTINE PRENATAL (OUTPATIENT)
Dept: OBGYN CLINIC | Age: 27
End: 2017-02-23

## 2017-02-23 ENCOUNTER — TELEPHONE (OUTPATIENT)
Dept: OBGYN CLINIC | Age: 27
End: 2017-02-23

## 2017-02-23 VITALS
WEIGHT: 187 LBS | DIASTOLIC BLOOD PRESSURE: 72 MMHG | SYSTOLIC BLOOD PRESSURE: 104 MMHG | BODY MASS INDEX: 31.92 KG/M2 | HEIGHT: 64 IN

## 2017-02-23 DIAGNOSIS — O26.819 PREGNANCY RELATED FATIGUE, ANTEPARTUM: ICD-10-CM

## 2017-02-23 DIAGNOSIS — O21.9 NAUSEA AND VOMITING IN PREGNANCY: Primary | ICD-10-CM

## 2017-02-23 DIAGNOSIS — Z3A.11 11 WEEKS GESTATION OF PREGNANCY: ICD-10-CM

## 2017-02-23 DIAGNOSIS — R63.4 WEIGHT LOSS: ICD-10-CM

## 2017-02-23 RX ORDER — PROMETHAZINE HYDROCHLORIDE 25 MG/1
25 TABLET ORAL
Qty: 30 TAB | Refills: 0 | Status: SHIPPED | OUTPATIENT
Start: 2017-02-23 | End: 2022-05-16

## 2017-02-23 RX ORDER — DOXYLAMINE SUCCINATE AND PYRIDOXINE HYDROCHLORIDE, DELAYED RELEASE TABLETS 10 MG/10 MG 10; 10 MG/1; MG/1
2 TABLET, DELAYED RELEASE ORAL
Qty: 100 TAB | Refills: 1 | Status: SHIPPED | OUTPATIENT
Start: 2017-02-23 | End: 2022-05-16

## 2017-02-23 NOTE — MR AVS SNAPSHOT
Visit Information Date & Time Provider Department Dept. Phone Encounter #  
 2/23/2017 11:20 AM Gamaliel Noel MD [employer] [phone] [id number] Your Appointments 3/1/2017  2:40 PM  
OB VISIT with Gamaliel Noel MD  
[employer] ([facility]) Appt Note: fob  
 [address] [facility] [address] [facility] [address] [address]  
[address] [facility] Upcoming Health Maintenance Date Due  
 HPV AGE 9Y-34Y (1 of 3 - Female 3 Dose Series) 12/6/2002 INFLUENZA AGE 9 TO ADULT 8/1/2016 PAP AKA CERVICAL CYTOLOGY 10/4/2019 Allergies as of 2/23/2017  Review Complete On: 2/23/2017 By: Fausto Oconnor LPN No Known Allergies Current Immunizations  Reviewed on 2/17/2016 Name Date DTaP 2/17/2013 Influenza Vaccine Whole 10/5/2011 Not reviewed this visit Vitals BP  
  
  
  
  
  
 104/72 BMI and BSA Data Body Mass Index Body Surface Area  
 32.1 kg/m 2 1.96 m 2 Preferred Pharmacy Pharmacy Name Phone [id number] [address] [facility] [phone] Your Updated Medication List  
  
   
This list is accurate as of: 2/23/17 11:51 AM.  Always use your most recent med list.  
  
  
  
  
 Casey Cartagena 1/35 (28) 1-35 mg-mcg Tab Generic drug:  norethindrone-ethinyl estradiol Take 1 Tab by mouth daily. diphenhydrAMINE 25 mg capsule Commonly known as:  BENADRYL Take 50 mg by mouth every six (6) hours as needed. ZOFRAN PO Take  by mouth. To-Do List   
 02/28/2017 9:30 AM  
  Appointment with ULTRASOUND 1 [facility] at [facility] ([phone]) Patient Instructions Weeks 10 to 14 of Your Pregnancy: Care Instructions Your Care Instructions By weeks 10 to 14 of your pregnancy, the placenta has formed inside your uterus. It is possible to hear your baby's heartbeat with a special ultrasound device. Your baby's eyes can and do move. The arms and legs can bend. This is a good time to think about testing for birth defects. There are two types of tests: screening and diagnostic. Screening tests show the chance that a baby has a certain birth defect. They can't tell you for sure that your baby has a problem. Diagnostic tests show if a baby has a certain birth defect. It's your choice whether to have these tests. You and your partner can talk to your doctor or midwife about birth defects tests. Follow-up care is a key part of your treatment and safety. Be sure to make and go to all appointments, and call your doctor if you are having problems. It's also a good idea to know your test results and keep a list of the medicines you take. How can you care for yourself at home? Decide about tests · You can have screening tests and diagnostic tests to check for birth defects. The decision to have a test for birth defects is personal. Think about your age, your chance of passing on a family disease, your need to know about any problems, and what you might do after you have the test results. ¨ Triple or quadruple (quad) blood tests. These screening tests can be done between 15 and 20 weeks of pregnancy. They check the amounts of three or four substances in your blood. The doctor looks at these test results, along with your age and other factors, to find out the chance that your baby may have certain problems. ¨ Amniocentesis. This diagnostic test is used to look for chromosomal problems in the baby's cells. It can be done between 15 and 20 weeks of pregnancy, usually around week 16. 
¨ Nuchal translucency test. This test uses ultrasound to measure the thickness of the area at the back of the baby's neck. An increase in the thickness can be an early sign of Down syndrome. ¨ Chorionic villus sampling (CVS). This is a test that looks for certain genetic problems with your baby. The same genes that are in your baby are in the placenta. A small piece of the placenta is taken out and tested. This test is done when you are 10 to 13 weeks pregnant. Ease discomfort · Slow down and take naps when you feel tired. · If your emotions swing, talk to someone. Crying, anxiety, and concentration problems are common. · If your gums bleed, try a softer toothbrush. If your gums are puffy and bleed a lot, see your dentist. 
· If you feel dizzy: ¨ Get up slowly after sitting or lying down. ¨ Drink plenty of fluids. ¨ Eat small snacks to keep your blood sugar stable. ¨ Put your head between your legs as though you were tying your shoelaces. ¨ Lie down with your legs higher than your head. Use pillows to prop up your feet. · If you have a headache: 
¨ Lie down. ¨ Ask your partner or a good friend for a neck massage. ¨ Try cool cloths over your forehead or across the back of your neck. ¨ Use acetaminophen (Tylenol) for pain relief. Do not use nonsteroidal anti-inflammatory drugs (NSAIDs), such as ibuprofen (Advil, Motrin) or naproxen (Aleve), unless your doctor says it is okay. · If you have a nosebleed, pinch your nose gently, and hold it for a short while. To prevent nosebleeds, try massaging a small dab of petroleum jelly, such as Vaseline, in your nostrils. · If your nose is stuffed up, try saline (saltwater) nose sprays. Do not use decongestant sprays. Care for your breasts · Wear a bra that gives you good support. · Know that changes in your breasts are normal. 
¨ Your breasts may get larger and more tender. Tenderness usually gets better by 12 weeks. ¨ Your nipples may get darker and larger, and small bumps around your nipples may show more. ¨ The veins in your chest and breasts may show more. · Don't worry about \"toughening'\" your nipples. Breastfeeding will naturally do this. Where can you learn more? Go to http://richard-horacio.info/. Enter A935 in the search box to learn more about \"Weeks 10 to 14 of Your Pregnancy: Care Instructions. \" Current as of: May 30, 2016 Content Version: 11.1 © 5757-2850 [employer]. Care instructions adapted under license by [employer] (which disclaims liability or warranty for this information). If you have questions about a medical condition or this instruction, always ask your healthcare professional. [address] any warranty or liability for your use of this information. Introducing [facility]! Dear Maria A Gao: Thank you for requesting a [employer] account. Our records indicate that you already have an active [employer] account. You can access your account anytime at [email or web address] Did you know that you can access your hospital and ER discharge instructions at any time in [employer] You can also review all of your test results from your hospital stay or ER visit. Additional Information If you have questions, please visit the Frequently Asked Questions section of the [employer] website at [email or web address] Remember, [employer] is NOT to be used for urgent needs. For medical emergencies, dial 911. Now available from your iPhone and Android! Please provide this summary of care documentation to your next provider. Your primary care clinician is listed as TITI DUMONT. If you have any questions after today's visit, please call [phone].

## 2017-02-23 NOTE — LETTER
2/23/2017 12:15 PM 
 
Ms. Russell Mathias [facility] [phone] To whom it may concern,  
 
Ms. Mikala Rausch has been unable to attend class due to nausea, vomiting, and fatigue during pregnancy. Please have the patient contact our office with any questions or concerns. Sincerely, Sotero Mathis MD

## 2017-02-23 NOTE — PROGRESS NOTES
[address] [facility]  [email or web address]  [phone]    Geogrette Santos MD, FACOG       OB/GYN: [facility] Problem visit    Chief Complaint:   Chief Complaint   Patient presents with    Pregnancy Problem     ER visit on 17 for nausea & vomiting       Patient Active Problem List    Diagnosis    Labor and delivery, indication for care       History of Present Illness: The patient is a 23 y.o.  female who reports having serve nausea/vomiting and stomach pains for at least 2 weeks. Patient seen in [facility] on . She states she has not been able to eat since . Has tried to eat small snacks but she vomits immediately after eating. N/V fatigue x 4 wks, couldn't go to school and take care of child. Seen at [facility] last night ? Ulcer. This is a new problem. This is not a routinely scheduled OB appointment. She reports the symptoms are is unchanged. Aggravating factors include none. Alleviating factors include none. She does not have other concerns. PFSH:  Past Medical History:   Diagnosis Date    Abnormal Pap smear of cervix 2015    ASCUS, +HPV - repap 1 year 2016    Anemia NEC     Encounter for insertion of mirena IUD 12    3 years ago    Encounter for IUD removal 2015    HX OTHER MEDICAL     weidom teeth removed    Hypotension     Pap smear for cervical cancer screening 10/04/2016    negative -  repap 1 yr      Past Surgical History:   Procedure Laterality Date    HX HEENT       Family History   Problem Relation Age of Onset    Hypertension Mother     Hypertension Maternal Grandmother     Hypertension Maternal Grandfather     Diabetes Maternal Grandfather      Social History   Substance Use Topics    Smoking status: Never Smoker    Smokeless tobacco: Never Used    Alcohol use No     No Known Allergies  Current Outpatient Prescriptions   Medication Sig    ONDANSETRON HCL (ZOFRAN PO) Take  by mouth.     doxylamine-pyridoxine (DICLEGIS) 10-10 mg TbEC Take 2 Tabs by mouth nightly.  promethazine (PHENERGAN) 25 mg tablet Take 1 Tab by mouth every six (6) hours as needed for Nausea.  ALYACEN 1/35, 28, 1-35 mg-mcg tab Take 1 Tab by mouth daily.  diphenhydrAMINE (BENADRYL) 25 mg capsule Take 50 mg by mouth every six (6) hours as needed. No current facility-administered medications for this visit. Review of Systems:  History obtained from the patient and written ROS questionnaire  Constitutional: negative for fevers, chills and weight loss  ENT ROS: negative for - hearing change, oral lesions or visual changes  Respiratory: negative for cough, wheezing or dyspnea on exertion  Cardiovascular: negative for chest pain, irregular heart beats, exertional chest pressure/discomfort  Gastrointestinal: see HPI  Genito-Urinary ROS: no dysuria, trouble voiding, or hematuria, see HPI  Inteument/breast: negative for rash, breast lump and nipple discharge  Musculoskeletal:negative for stiff joints, neck pain and muscle weakness  Endocrine ROS: negative for - breast changes, galactorrhea or temperature intolerance  Hematological and Lymphatic ROS: negative for - blood clots, bruising or swollen lymph nodes    Physical Exam:  Visit Vitals    /72    Ht 5' 4\" (1.626 m)    Wt 187 lb (84.8 kg)    BMI 32.1 kg/m2       GENERAL: alert, well appearing, and in no distress  HEAD; normocephalic, atraumatic  PULM: clear to auscultation, no wheezes, rales or rhonchi, symmetric air entry   COR: normal rate and regular rhythm, S1 and S2 normal   ABDOMEN: soft, mild upper abd tender no r/g, nondistended, no masses or organomegaly   BACK: normal range of motion, no tenderness, no CVAT   NEURO: alert, oriented, normal speech    See PN flowsheet for additional notes and exam    Assessment:  23 y.o.  11w4d   Encounter Diagnoses   Name Primary?     Nausea and vomiting in pregnancy Yes    Pregnancy related fatigue, antepartum     11 weeks gestation of pregnancy     Weight loss        Plan:  An evaluation of this patient's concern is planned. The patient is advised that she should contact the office if she does not note improvement or if symptoms recur  She should contact our office with any questions or concerns  She could keep her routine OB appointment. Disc small freq meals  Notify MD if unable to tolerate po  Rec GI fu to evaluate options for ulcer if NI in upper abd pain  Get er records/US labs  Add diclegis, ranitidine, phenergan  Notify MD if NI, consider IVF if NI    Orders Placed This Encounter    doxylamine-pyridoxine (DICLEGIS) 10-10 mg TbEC    promethazine (PHENERGAN) 25 mg tablet       No results found for this visit on 02/23/17.     Reinaldo Umana MD

## 2017-02-23 NOTE — PATIENT INSTRUCTIONS

## 2017-02-27 ENCOUNTER — TELEPHONE (OUTPATIENT)
Dept: OBGYN CLINIC | Age: 27
End: 2017-02-27

## 2017-02-27 NOTE — TELEPHONE ENCOUNTER
12 wks pt called wanting to know why she had a  visit because no one informed her from this office. Spoke to Φ[place] with [facility] and stated that this is the patient's 1st trimester screening. Informed patient of this information and she verbalized understanding.     ALBERTO

## 2017-02-28 ENCOUNTER — HOSPITAL ENCOUNTER (OUTPATIENT)
Dept: PERINATAL CARE | Age: 27
Discharge: HOME | End: 2017-02-28
Attending: OBSTETRICS & GYNECOLOGY
Payer: MEDICAID

## 2017-02-28 LAB — NT, EXTERNAL: NORMAL

## 2017-02-28 PROCEDURE — 76801 OB US < 14 WKS SINGLE FETUS: CPT | Performed by: OBSTETRICS & GYNECOLOGY

## 2017-02-28 PROCEDURE — 76813 OB US NUCHAL MEAS 1 GEST: CPT | Performed by: OBSTETRICS & GYNECOLOGY

## 2017-03-01 ENCOUNTER — ROUTINE PRENATAL (OUTPATIENT)
Dept: OBGYN CLINIC | Age: 27
End: 2017-03-01

## 2017-03-01 VITALS
HEIGHT: 64 IN | WEIGHT: 186 LBS | DIASTOLIC BLOOD PRESSURE: 66 MMHG | SYSTOLIC BLOOD PRESSURE: 109 MMHG | BODY MASS INDEX: 31.76 KG/M2

## 2017-03-01 DIAGNOSIS — Z34.80 PRENATAL CARE OF MULTIGRAVIDA, ANTEPARTUM: Primary | ICD-10-CM

## 2017-03-01 NOTE — PATIENT INSTRUCTIONS

## 2017-03-01 NOTE — MR AVS SNAPSHOT
Visit Information Date & Time Provider Department Dept. Phone Encounter #  
 3/1/2017  2:40 PM Nilton Sandy MD [phone] [id number] Upcoming Health Maintenance Date Due  
 HPV AGE 9Y-34Y (1 of 3 - Female 3 Dose Series) 12/6/2002 INFLUENZA AGE 9 TO ADULT 8/1/2016 PAP AKA CERVICAL CYTOLOGY 10/4/2019 Allergies as of 3/1/2017  Review Complete On: 3/1/2017 By: Carlyle Vizcarra LPN No Known Allergies Current Immunizations  Reviewed on 2/17/2016 Name Date DTaP 2/17/2013 Influenza Vaccine Whole 10/5/2011 Not reviewed this visit Vitals BP  
  
  
  
  
  
 109/66 BMI and BSA Data Body Mass Index Body Surface Area  
 31.93 kg/m 2 1.95 m 2 Preferred Pharmacy Pharmacy Name Phone [id number] [phone] Your Updated Medication List  
  
   
This list is accurate as of: 3/1/17  3:13 PM.  Always use your most recent med list.  
  
  
  
  
 Concetta Vargas 1/35 (28) 1-35 mg-mcg Tab Generic drug:  norethindrone-ethinyl estradiol Take 1 Tab by mouth daily. diphenhydrAMINE 25 mg capsule Commonly known as:  BENADRYL Take 50 mg by mouth every six (6) hours as needed. doxylamine-pyridoxine 10-10 mg Tbec Commonly known as:  Peg Christiano Take 2 Tabs by mouth nightly. promethazine 25 mg tablet Commonly known as:  PHENERGAN Take 1 Tab by mouth every six (6) hours as needed for Nausea. ZOFRAN PO Take  by mouth. Patient Instructions Weeks 10 to 14 of Your Pregnancy: Care Instructions Your Care Instructions By weeks 10 to 15 of your pregnancy, the placenta has formed inside your uterus. It is possible to hear your baby's heartbeat with a special ultrasound device. Your baby's eyes can and do move. The arms and legs can bend. This is a good time to think about testing for birth defects.  There are two types of tests: screening and diagnostic. Screening tests show the chance that a baby has a certain birth defect. They can't tell you for sure that your baby has a problem. Diagnostic tests show if a baby has a certain birth defect. It's your choice whether to have these tests. You and your partner can talk to your doctor or midwife about birth defects tests. Follow-up care is a key part of your treatment and safety. Be sure to make and go to all appointments, and call your doctor if you are having problems. It's also a good idea to know your test results and keep a list of the medicines you take. How can you care for yourself at home? Decide about tests · You can have screening tests and diagnostic tests to check for birth defects. The decision to have a test for birth defects is personal. Think about your age, your chance of passing on a family disease, your need to know about any problems, and what you might do after you have the test results. ¨ Triple or quadruple (quad) blood tests. These screening tests can be done between 15 and 20 weeks of pregnancy. They check the amounts of three or four substances in your blood. The doctor looks at these test results, along with your age and other factors, to find out the chance that your baby may have certain problems. ¨ Amniocentesis. This diagnostic test is used to look for chromosomal problems in the baby's cells. It can be done between 15 and 20 weeks of pregnancy, usually around week 16. 
¨ Nuchal translucency test. This test uses ultrasound to measure the thickness of the area at the back of the baby's neck. An increase in the thickness can be an early sign of Down syndrome. ¨ Chorionic villus sampling ([facility]). This is a test that looks for certain genetic problems with your baby. The same genes that are in your baby are in the placenta. A small piece of the placenta is taken out and tested. This test is done when you are 10 to 13 weeks pregnant. Ease discomfort · Slow down and take naps when you feel tired. · If your emotions swing, talk to someone. Crying, anxiety, and concentration problems are common. · If your gums bleed, try a softer toothbrush. If your gums are puffy and bleed a lot, see your dentist. 
· If you feel dizzy: ¨ Get up slowly after sitting or lying down. ¨ Drink plenty of fluids. ¨ Eat small snacks to keep your blood sugar stable. ¨ Put your head between your legs as though you were tying your shoelaces. ¨ Lie down with your legs higher than your head. Use pillows to prop up your feet. · If you have a headache: 
¨ Lie down. ¨ Ask your partner or a good friend for a neck massage. ¨ Try cool cloths over your forehead or across the back of your neck. ¨ Use acetaminophen (Tylenol) for pain relief. Do not use nonsteroidal anti-inflammatory drugs (NSAIDs), such as ibuprofen (Advil, Motrin) or naproxen (Aleve), unless your doctor says it is okay. · If you have a nosebleed, pinch your nose gently, and hold it for a short while. To prevent nosebleeds, try massaging a small dab of petroleum jelly, such as Vaseline, in your nostrils. · If your nose is stuffed up, try saline (saltwater) nose sprays. Do not use decongestant sprays. Care for your breasts · Wear a bra that gives you good support. · Know that changes in your breasts are normal. 
¨ Your breasts may get larger and more tender. Tenderness usually gets better by 12 weeks. ¨ Your nipples may get darker and larger, and small bumps around your nipples may show more. ¨ The veins in your chest and breasts may show more. · Don't worry about \"toughening'\" your nipples. Breastfeeding will naturally do this. Where can you learn more? Go to http://richard-horacio.info/. Enter U152 in the search box to learn more about \"Weeks 10 to 14 of Your Pregnancy: Care Instructions. \" Current as of: May 30, 2016 Content Version: 11.1 © 5344-7046 Healthwise, Incorporated. Care instructions adapted under license by [employer] (which disclaims liability or warranty for this information). If you have questions about a medical condition or this instruction, always ask your healthcare professional. [address] any warranty or liability for your use of this information. Introducing [facility] & HEALTH SERVICES! Dear Symone Russ: Thank you for requesting a [employer] account. Our records indicate that you already have an active [employer] account. You can access your account anytime at [email or web address] Did you know that you can access your hospital and ER discharge instructions at any time in [employer]? You can also review all of your test results from your hospital stay or ER visit. Additional Information If you have questions, please visit the Frequently Asked Questions section of the [employer] website at [email or web address] Remember, [employer] is NOT to be used for urgent needs. For medical emergencies, dial 911. Now available from your iPhone and Android! Please provide this summary of care documentation to your next provider. Your primary care clinician is listed as TITI DUMONT. If you have any questions after today's visit, please call [phone].

## 2017-03-01 NOTE — PROGRESS NOTES
[facility]  [email or web address]  [phone]    Myrla Runner, MD, FACOG     Follow-up OB visit    Chief Complaint   Patient presents with    Routine Prenatal Visit       Vitals:    03/01/17 1512   BP: 109/66   Weight: 186 lb (84.4 kg)   Height: 5' 4\" (1.626 m)       Patient Active Problem List    Diagnosis Date Noted    Labor and delivery, indication for care 12/12/2011       The patient reports the following concerns: patient reports nausea and vomiting has subsided. Currently doesn't have an appetite. Flu vaccine: patient refused  Tdap: N/A    See PN flowsheet for exam    23 y.o. [place] 12w3d   Encounter Diagnosis   Name Primary?  Prenatal care of multigravida, antepartum Yes         Disc n/v in pregnancy and small freq meals  Will monitor wt gain     [] SAB/bleeding precautions reviewed   [] PTL/PPROM precautions reviewed   [] Labor precautions reviewed   [] Fetal kick counts discussed   [] Labs reviewed with patient   [] Cori Hadley precautions reviewed   [] Consent reviewed   [] Handouts given to pt   [] Glucola handout    [] GBS/labor/Magic Hour handout   []    []    []    []    Follow-up Disposition:  Return in about 4 weeks (around 3/29/2017) for Follow up OB visit. No orders of the defined types were placed in this encounter.       Myrla Runner, MD

## 2017-03-05 NOTE — PROGRESS NOTES
NT-ultrasound component normal.  Update prenatals. (Confirm biochemical/serum results/graph in chart.  Contact MFM prn)  Note: this looks like one of the pt that did not get a purple packet

## 2017-03-07 ENCOUNTER — TELEPHONE (OUTPATIENT)
Dept: PERINATAL CARE | Age: 27
End: 2017-03-07

## 2017-03-07 PROBLEM — Z34.80 SUPERVISION OF OTHER NORMAL PREGNANCY, ANTEPARTUM: Status: ACTIVE | Noted: 2017-03-07

## 2017-03-28 ENCOUNTER — ROUTINE PRENATAL (OUTPATIENT)
Dept: OBGYN CLINIC | Age: 27
End: 2017-03-28

## 2017-03-28 VITALS
BODY MASS INDEX: 32.5 KG/M2 | HEIGHT: 64 IN | SYSTOLIC BLOOD PRESSURE: 100 MMHG | WEIGHT: 190.4 LBS | DIASTOLIC BLOOD PRESSURE: 62 MMHG | RESPIRATION RATE: 18 BRPM

## 2017-03-28 DIAGNOSIS — Z34.80 PRENATAL CARE OF MULTIGRAVIDA, ANTEPARTUM: Primary | ICD-10-CM

## 2017-03-28 NOTE — PROGRESS NOTES
[facility]  [email or web address]  [phone]    Howie Mosher MD, FACOG     Follow-up OB visit    Chief Complaint   Patient presents with    Routine Prenatal Visit       Vitals:    03/28/17 1349   BP: 100/62   Resp: 18   Weight: 190 lb 6.4 oz (86.4 kg)   Height: 5' 4\" (1.626 m)       Patient Active Problem List    Diagnosis Date Noted    Supervision of other normal pregnancy, antepartum 03/07/2017       The patient reports the following concerns: none    Flu vaccine: patient refused  Tdap: N/A    See PN flowsheet for exam    23 y.o. Kathrine Bright  16w2d   Encounter Diagnosis   Name Primary?  Prenatal care of multigravida, antepartum Yes        [x] SAB/bleeding precautions reviewed   [] PTL/PPROM precautions reviewed   [] Labor precautions reviewed   [] Fetal kick counts discussed   [] Labs reviewed with patient   [] Merlyn Pond precautions reviewed   [] Consent reviewed   [] Handouts given to pt   [] Glucola handout    [] GBS/labor/Magic Hour handout   []    []    []    []    Follow-up Disposition:  Return in about 4 weeks (around 4/25/2017) for Follow up OB visit.     Orders Placed This Encounter    Jemima BEE MD

## 2017-03-28 NOTE — MR AVS SNAPSHOT
Visit Information Date & Time Provider Department Dept. Phone Encounter #  
 3/28/2017  1:30 PM MD Samuel Brandon [phone] Your Appointments 4/28/2017  1:30 PM  
ULTRASOUND with MEAGHAN Ford ([facility]) Appt Note: 20wk u/s then 4wk fob TP  
 [address] [place] [address]  
[phone]  
  
   
 [address] [place] [address]  
  
    
 4/28/2017  2:00 PM  
OB VISIT with MD Samuel Brandon ([facility]) Appt Note: 20wk u/s then 4wk fob TP  
 [address] [place] [address]  
[phone]  
  
   
 [address] [address]  
  
    
 5/25/2017  1:40 PM  
OB VISIT with MD Samuel Brandon ([facility]) Appt Note: 4wk fob TP  
 [address] [place] [address]  
[phone] 6/21/2017  1:30 PM  
OB VISIT with MD Samuel Brandon ([facility]) Appt Note: 4wk fob w/ glucola TP  
 [address] [place] [address]  
[phone]  
  
    
 7/7/2017  1:40 PM  
OB VISIT with MD Samuel Brandon ([facility]) Appt Note: 2wk fob TP  
 [address] [place] [address]  
[phone] Upcoming Health Maintenance Date Due  
 HPV AGE 9Y-34Y (1 of 3 - Female 3 Dose Series) 12/6/2002 INFLUENZA AGE 9 TO ADULT 8/1/2016 PAP AKA CERVICAL CYTOLOGY 10/4/2019 Allergies as of 3/28/2017  Review Complete On: 3/28/2017 By: Daryle Schneider No Known Allergies Current Immunizations  Reviewed on 2/17/2016 Name Date DTaP 2/17/2013 Influenza Vaccine Whole 10/5/2011 Not reviewed this visit Vitals BP Resp Height(growth percentile) Weight(growth percentile) LMP BMI 100/62 (BP 1 Location: Left arm, BP Patient Position: Sitting) 18 5' 4\" (1.626 m) 190 lb 6.4 oz (86.4 kg) 09/08/2016 (Approximate) 32.68 kg/m2 OB Status Smoking Status Pregnant Never Smoker BMI and BSA Data Body Mass Index Body Surface Area  
 32.68 kg/m 2 1.98 m 2 Preferred Pharmacy Pharmacy Name Phone [id number] [address] [phone] Your Updated Medication List  
  
   
This list is accurate as of: 3/28/17  1:53 PM.  Always use your most recent med list.  
  
  
  
  
 Milas Lung 1/35 (28) 1-35 mg-mcg Tab Generic drug:  norethindrone-ethinyl estradiol Take 1 Tab by mouth daily. diphenhydrAMINE 25 mg capsule Commonly known as:  BENADRYL Take 50 mg by mouth every six (6) hours as needed. doxylamine-pyridoxine 10-10 mg Tbec Commonly known as:  Arliss Pounds Take 2 Tabs by mouth nightly. promethazine 25 mg tablet Commonly known as:  PHENERGAN Take 1 Tab by mouth every six (6) hours as needed for Nausea. ZOFRAN PO Take  by mouth. Patient Instructions Weeks 14 to 18 of Your Pregnancy: Care Instructions Your Care Instructions During this time, you may start to \"show,\" so that you look pregnant to people around you. You may also notice some changes in your skin, such as itchy spots on your palms or acne on your face. Your baby is now able to pass urine, and your baby's first stool (meconium) is starting to collect in his or her intestines. Hair is also beginning to grow on your baby's head. At your next visit, between weeks 18 and 20, your doctor may do an ultrasound test. The test allows your doctor to check for certain problems. Your doctor can also tell the sex of your baby. This is a good time to think about whether you want to know whether your baby is a boy or a girl. Talk to your doctor about getting a flu shot to help keep you healthy during your pregnancy. As your pregnancy moves along, it is common to worry or feel anxious. Your body is changing a lot. And you are thinking about giving birth, the health of your baby, and becoming a parent. You can learn to cope with any anxiety and stress you feel. Follow-up care is a key part of your treatment and safety. Be sure to make and go to all appointments, and call your doctor if you are having problems. It's also a good idea to know your test results and keep a list of the medicines you take. How can you care for yourself at home? Reduce stress · Ask for help with cooking and housekeeping. · Figure out who or what causes your stress. Avoid these people or situations as much as possible. · Relax every day. Taking 10- to 15-minute breaks can make a big difference. Take a walk, listen to music, or take a warm bath. · Learn relaxation techniques at prenatal or yoga class. Or buy a relaxation tape. · List your fears about having a baby and becoming a parent. Share the list with someone you trust. Decide which worries are really small, and try to let them go. Exercise · If you did not exercise much before pregnancy, start slowly. Walking is best. Huang Charles yourself, and do a little more every day. · Brisk walking, easy jogging, low-impact aerobics, water aerobics, and yoga are good choices. Some sports, such as scuba diving, horseback riding, downhill skiing, gymnastics, and water skiing, are not a good idea. · Try to do at least 2½ hours a week of moderate exercise, such as a fast walk. One way to do this is to be active 30 minutes a day, at least 5 days a week. It's fine to be active in blocks of 10 minutes or more throughout your day and week. · Wear loose clothing. And wear shoes and a bra that provide good support. · Warm up and cool down to start and finish your exercise. · If you want to use weights, be sure to use light weights. They reduce stress on your joints. Stay at the best weight for you · Experts recommend that you gain about 1 pound a month during the first 3 months of your pregnancy. · Experts recommend that you gain about 1 pound a week during your last 6 months of pregnancy, for a total weight gain of 25 to 35 pounds. · If you are underweight, you will need to gain more weight (about 28 to 40 pounds). · If you are overweight, you may not need to gain as much weight (about 15 to 25 pounds). · If you are gaining weight too fast, use common sense. Exercise every day, and limit sweets, fast foods, and fats. Choose lean meats, fruits, and vegetables. · If you are having twins or more, your doctor may refer you to a dietitian. Where can you learn more? Go to http://richard-horacio.info/. Enter Y488 in the search box to learn more about \"Weeks 14 to 18 of Your Pregnancy: Care Instructions. \" Current as of: May 30, 2016 Content Version: 11.1 © 7828-8514 Cocrystal Discovery. Care instructions adapted under license by [employer] (which disclaims liability or warranty for this information). If you have questions about a medical condition or this instruction, always ask your healthcare professional. [address] any warranty or liability for your use of this information. Introducing [facility] & HEALTH SERVICES! Dear Tj Finder: Thank you for requesting a Cocodot account. Our records indicate that you already have an active Coc[employer] account. You can access your account anytime at [email or web address] Did you know that you can access your hospital and ER discharge instructions at any time in Cocodot? You can also review all of your test results from your hospital stay or ER visit. Additional Information If you have questions, please visit the Frequently Asked Questions section of the [employer] website at [email or web address] Remember, [employer] is NOT to be used for urgent needs.  For medical emergencies, dial 911. Now available from your iPhone and Android! Please provide this summary of care documentation to your next provider. Your primary care clinician is listed as TITI DUMONT. If you have any questions after today's visit, please call [phone].

## 2017-03-28 NOTE — PATIENT INSTRUCTIONS

## 2017-03-30 ENCOUNTER — TELEPHONE (OUTPATIENT)
Dept: OBGYN CLINIC | Age: 27
End: 2017-03-30

## 2017-03-30 ENCOUNTER — ROUTINE PRENATAL (OUTPATIENT)
Dept: OBGYN CLINIC | Age: 27
End: 2017-03-30

## 2017-03-30 VITALS — SYSTOLIC BLOOD PRESSURE: 116 MMHG | DIASTOLIC BLOOD PRESSURE: 72 MMHG | WEIGHT: 190 LBS | BODY MASS INDEX: 32.61 KG/M2

## 2017-03-30 DIAGNOSIS — R35.0 FREQUENT URINATION: ICD-10-CM

## 2017-03-30 DIAGNOSIS — R10.9 ABDOMINAL PAIN, UNSPECIFIED LOCATION: Primary | ICD-10-CM

## 2017-03-30 LAB
AFP ADJ MOM SERPL: 0.63
AFP INTERP SERPL-IMP: NORMAL
AFP INTERP SERPL-IMP: NORMAL
AFP SERPL-MCNC: 19.4 NG/ML
AFP, MATERNAL, EXTERNAL: NORMAL
AGE AT DELIVERY: 25.7 YEARS
COMMENT, 018013: NORMAL
GA METHOD: NORMAL
GA: 16 WEEKS
IDDM PATIENT QL: NO
Lab: NORMAL
MULTIPLE PREGNANCY: NORMAL
NEURAL TUBE DEFECT RISK FETUS: NORMAL %
RESULTS, 017004: NORMAL

## 2017-03-30 RX ORDER — NITROFURANTOIN 25; 75 MG/1; MG/1
100 CAPSULE ORAL 2 TIMES DAILY
Qty: 14 CAP | Refills: 0 | Status: SHIPPED | OUTPATIENT
Start: 2017-03-30 | End: 2017-04-06

## 2017-03-30 NOTE — TELEPHONE ENCOUNTER
Call received at 554am    23year old patient  16w4d pregnant patient last seen in the office on 3/28/17. Patient calling to say that she started with urgency yesterday and when she goes it is just a little bit. Patient reports pain in her lower abdomen. Patient states she was experiencing that at her visit on 3/28/17. Patient place on the schedule to be seen today at 2:40pm as per Dr. Alejandra Diehl. Patient verbalized understanding.

## 2017-03-30 NOTE — PATIENT INSTRUCTIONS
Urinary Tract Infection in Pregnancy: Care Instructions  Your Care Instructions    A urinary tract infection, or UTI, is an infection of the bladder and other urinary structures. Most UTIs occur in the bladder. They often cause pain or burning when you urinate. UTI is the most common bacterial infection in pregnancy. If untreated, a UTI could lead to problems such as a kidney infection or  labor. Most UTIs can be cured with antibiotics. Your doctor will prescribe an antibiotic that is safe during pregnancy. Be sure to finish your medicine so that the infection does not spread to your kidneys. Follow-up care is a key part of your treatment and safety. Be sure to make and go to all appointments, and call your doctor if you are having problems. It's also a good idea to know your test results and keep a list of the medicines you take. How can you care for yourself at home? · Take your antibiotics as directed. Do not stop taking them just because you feel better. You need to take the full course of antibiotics. · Drink extra water and other fluids for the next day or two. This will help wash out the bacteria causing the infection. If you have kidney, heart, or liver disease and have to limit fluids, talk with your doctor before you increase the amount of fluids you drink. · Do not drink alcohol. · Urinate often. Try to empty your bladder each time. Preventing UTIs  · Drink plenty of fluids, enough so that your urine is light yellow or clear like water. This helps you urinate often, which clears bacteria from your system. If you have kidney, heart, or liver disease and have to limit fluids, talk with your doctor before you increase the amount of fluids you drink. · Urinate when you first have the urge. · Urinate right after you have sex. This is the best way for women to avoid UTIs. · When going to the bathroom, wipe from front to back to keep bacteria from entering the vagina or urethra.   When should you call for help? Call your doctor now or seek immediate medical care if:  · Symptoms such as fever, chills, nausea, or vomiting get worse or appear for the first time. · You have new pain in your back just below your rib cage. This is called flank pain. · There is new blood or pus in your urine. · You have any problems with your antibiotic medicine. Watch closely for changes in your health, and be sure to contact your doctor if:  · You are not getting better after 1 day (24 hours). · You have new symptoms, such as blood in your urine. Where can you learn more? Go to http://richard-horacio.info/. Enter M982 in the search box to learn more about \"Urinary Tract Infection in Pregnancy: Care Instructions. \"  Current as of: November 28, 2016  Content Version: 11.2  © 7423-4470 Mob[employer]o. Care instructions adapted under license by Zooppa (which disclaims liability or warranty for this information). If you have questions about a medical condition or this instruction, always ask your healthcare professional. [address] any warranty or liability for your use of this information.

## 2017-03-30 NOTE — MR AVS SNAPSHOT
Visit Information Date & Time Provider Department Dept. Phone Encounter #  
 3/30/2017  2:40 PM Erik Celestin MD [employer] [phone] Your Appointments 4/28/2017  1:30 PM  
ULTRASOUND with MEAGHAN Dang [employer] [facility] ([address] [place] [address] Road) Appt Note: 20wk u/s then 4wk fob TP  
 [address] [facility]  
[phone]  
  
   
 [id number] [place] [address]  
  
    
 4/28/2017  2:00 PM  
OB VISIT with Erik Celestin MD  
[employer] [facility] ([address] [place] [address] Road) Appt Note: 20wk u/s then 4wk fob TP  
 [address] [facility]  
[phone]  
  
   
 [address] [place] [address]  
  
    
 5/25/2017  1:40 PM  
OB VISIT with Erik Celestin MD  
[facility] ([address] [place] [address] Road) Appt Note: 4wk fob TP  
 [address] [facility]  
[phone] 6/21/2017  1:30 PM  
OB VISIT with Erik Celestin MD  
[employer] [facility] ([address] [place] [address] Road) Appt Note: 4wk fob w/ glucola TP  
 [address] [facility]  
[phone]  
  
    
 7/7/2017  1:40 PM  
OB VISIT with Erik Celestin MD  
[employer] [facility] ([address] [place] [address] Road) Appt Note: 2wk fob TP  
 [address] [facility]  
[phone] Upcoming Health Maintenance Date Due  
 HPV AGE 9Y-34Y (1 of 3 - Female 3 Dose Series) 12/6/2002 INFLUENZA AGE 9 TO ADULT 8/1/2016 PAP AKA CERVICAL CYTOLOGY 10/4/2019 Allergies as of 3/30/2017  Review Complete On: 3/28/2017 By: Erik Celestin MD  
 No Known Allergies Current Immunizations  Reviewed on 2/17/2016 Name Date DTaP 2/17/2013 Influenza Vaccine Whole 10/5/2011 Not reviewed this visit Vitals BP Weight(growth percentile) LMP BMI OB Status Smoking Status 116/72 190 lb (86.2 kg) 2016 (Approximate) 32.61 kg/m2 Pregnant Never Smoker BMI and BSA Data Body Mass Index Body Surface Area  
 32.61 kg/m 2 1.97 m 2 Preferred Pharmacy Pharmacy Name Phone [id number] [address] [phone] Your Updated Medication List  
  
   
This list is accurate as of: 3/30/17  3:00 PM.  Always use your most recent med list.  
  
  
  
  
 Anell Polo  (28) 1-35 mg-mcg Tab Generic drug:  norethindrone-ethinyl estradiol Take 1 Tab by mouth daily. diphenhydrAMINE 25 mg capsule Commonly known as:  BENADRYL Take 50 mg by mouth every six (6) hours as needed. doxylamine-pyridoxine 10-10 mg Tbec Commonly known as:  Roetta Lands Take 2 Tabs by mouth nightly. promethazine 25 mg tablet Commonly known as:  PHENERGAN Take 1 Tab by mouth every six (6) hours as needed for Nausea. ZOFRAN PO Take  by mouth. Patient Instructions Urinary Tract Infection in Pregnancy: Care Instructions Your Care Instructions A urinary tract infection, or UTI, is an infection of the bladder and other urinary structures. Most UTIs occur in the bladder. They often cause pain or burning when you urinate. UTI is the most common bacterial infection in pregnancy. If untreated, a UTI could lead to problems such as a kidney infection or  labor. Most UTIs can be cured with antibiotics. Your doctor will prescribe an antibiotic that is safe during pregnancy. Be sure to finish your medicine so that the infection does not spread to your kidneys. Follow-up care is a key part of your treatment and safety. Be sure to make and go to all appointments, and call your doctor if you are having problems. It's also a good idea to know your test results and keep a list of the medicines you take. How can you care for yourself at home? · Take your antibiotics as directed. Do not stop taking them just because you feel better. You need to take the full course of antibiotics. · Drink extra water and other fluids for the next day or two. This will help wash out the bacteria causing the infection. If you have kidney, heart, or liver disease and have to limit fluids, talk with your doctor before you increase the amount of fluids you drink. · Do not drink alcohol. · Urinate often. Try to empty your bladder each time. Preventing UTIs · Drink plenty of fluids, enough so that your urine is light yellow or clear like water. This helps you urinate often, which clears bacteria from your system. If you have kidney, heart, or liver disease and have to limit fluids, talk with your doctor before you increase the amount of fluids you drink. · Urinate when you first have the urge. · Urinate right after you have sex. This is the best way for women to avoid UTIs. · When going to the bathroom, wipe from front to back to keep bacteria from entering the vagina or urethra. When should you call for help? Call your doctor now or seek immediate medical care if: · Symptoms such as fever, chills, nausea, or vomiting get worse or appear for the first time. · You have new pain in your back just below your rib cage. This is called flank pain. · There is new blood or pus in your urine. · You have any problems with your antibiotic medicine. Watch closely for changes in your health, and be sure to contact your doctor if: 
· You are not getting better after 1 day (24 hours). · You have new symptoms, such as blood in your urine. Where can you learn more? Go to http://richard-horacio.info/. Enter M982 in the search box to learn more about \"Urinary Tract Infection in Pregnancy: Care Instructions. \" Current as of: November 28, 2016 Content Version: 11.2 © 6819-4826 [employer].  Care instructions adapted under license by [address] (which disclaims liability or warranty for this information). If you have questions about a medical condition or this instruction, always ask your healthcare professional. [address] any warranty or liability for your use of this information. Introducing [facility]! Dear Juan J Miller: Thank you for requesting a [employer]) account. Our records indicate that you already have an active [employer] account. You can access your account anytime at [email or web address] Did you know that you can access your hospital and ER discharge instructions at any time in [employer])? You can also review all of your test results from your hospital stay or ER visit. Additional Information If you have questions, please visit the Frequently Asked Questions section of the [employer] website at [email or web address] Remember, [employer] is NOT to be used for urgent needs. For medical emergencies, dial 911. Now available from your iPhone and Android! Please provide this summary of care documentation to your next provider. Your primary care clinician is listed as TITI DUMONT. If you have any questions after today's visit, please call [phone].

## 2017-03-30 NOTE — PROGRESS NOTES
[address] [facility]  [email or web address]  [phone]    Lorna Mckeon MD, FACOG       OB/GYN: Veena Bhavik Problem visit    Chief Complaint:   Chief Complaint   Patient presents with    Pregnancy Problem    UTI       Patient Active Problem List    Diagnosis    Supervision of other normal pregnancy, antepartum     NT WNL         History of Present Illness: The patient is a 23 y.o.  female who reports having lower abdominal pain and pressure, and frequent urination. for a few days. Trace blood in urine. This is a new problem. This is not a routinely scheduled OB appointment. She reports the symptoms are has worsened slightly. Aggravating factors include none. Alleviating factors include none. She does not have other concerns. PFSH:  Past Medical History:   Diagnosis Date    Abnormal Pap smear of cervix 2015    ASCUS, +HPV - repap 1 year     Anemia NEC     Encounter for insertion of mirena IUD 12    3 years ago    Encounter for IUD removal 2015    HX OTHER MEDICAL     weidom teeth removed    Hypotension     Pap smear for cervical cancer screening 10/04/2016    negative -  repap 1 yr      Past Surgical History:   Procedure Laterality Date    HX HEENT       Family History   Problem Relation Age of Onset    Hypertension Mother     Hypertension Maternal Grandmother     Hypertension Maternal Grandfather     Diabetes Maternal Grandfather      Social History   Substance Use Topics    Smoking status: Never Smoker    Smokeless tobacco: Never Used    Alcohol use No     No Known Allergies  Current Outpatient Prescriptions   Medication Sig    nitrofurantoin, macrocrystal-monohydrate, (MACROBID) 100 mg capsule Take 1 Cap by mouth two (2) times a day for 7 days.  ONDANSETRON HCL (ZOFRAN PO) Take  by mouth.  doxylamine-pyridoxine (DICLEGIS) 10-10 mg TbEC Take 2 Tabs by mouth nightly.     promethazine (PHENERGAN) 25 mg tablet Take 1 Tab by mouth every six (6) hours as needed for Nausea.  ALYACEN 1/35, 28, 1-35 mg-mcg tab Take 1 Tab by mouth daily.  diphenhydrAMINE (BENADRYL) 25 mg capsule Take 50 mg by mouth every six (6) hours as needed. No current facility-administered medications for this visit.         Review of Systems:  History obtained from the patient and written ROS questionnaire  Constitutional: negative for fevers, chills and weight loss  ENT ROS: negative for - hearing change, oral lesions or visual changes  Respiratory: negative for cough, wheezing or dyspnea on exertion  Cardiovascular: negative for chest pain, irregular heart beats, exertional chest pressure/discomfort  Gastrointestinal: negative for dysphagia, nausea and vomiting  Genito-Urinary ROS: see HPI, see HPI  Inteument/breast: negative for rash, breast lump and nipple discharge  Musculoskeletal:negative for stiff joints, neck pain and muscle weakness  Endocrine ROS: negative for - breast changes, galactorrhea or temperature intolerance  Hematological and Lymphatic ROS: negative for - blood clots, bruising or swollen lymph nodes    Physical Exam:  Visit Vitals    /72    Wt 190 lb (86.2 kg)    BMI 32.61 kg/m2       GENERAL: alert, well appearing, and in no distress  HEAD; normocephalic, atraumatic  ABDOMEN: soft, nontender, nondistended, no masses or organomegaly   BACK: normal range of motion, no tenderness, no CVAT   EGBUS: no lesions, no inflammation, no masses  VULVA: normal appearing vulva with no masses, tenderness or lesions  VAGINA: normal appearing vagina with normal color, no lesions, no discharge  CERVIX: normal appearing cervix without discharge or lesions, non tender  UTERUS: uterus is enlarged in size, gravid appropriate for gestational age  ADNEXA: normal adnexa in size, nontender and no masses  NEURO: alert, oriented, normal speech    See PN flowsheet for additional notes and exam    Assessment:  23 y.o.  16w4d   Encounter Diagnoses Name Primary?  Abdominal pain, unspecified location Yes    Frequent urination        Plan:  An evaluation of this patient's concern is planned. The patient is advised that she should contact the office if she does not note improvement or if symptoms recur  She should contact our office with any questions or concerns  She could keep her routine OB appointment. [address] water intake    Orders Placed This Encounter    CULTURE, URINE    nitrofurantoin, macrocrystal-monohydrate, (MACROBID) 100 mg capsule       No results found for this visit on 03/30/17.     Dilshad Spencer MD

## 2017-04-02 LAB — BACTERIA UR CULT: NORMAL

## 2017-04-03 NOTE — PROGRESS NOTES
The results are normal.   Please notify patient. Recommend f/u if still having symptoms/problems or has additional concerns.   Update PNL  17w2v

## 2017-04-19 ENCOUNTER — TELEPHONE (OUTPATIENT)
Dept: OBGYN CLINIC | Age: 27
End: 2017-04-19

## 2017-04-19 NOTE — TELEPHONE ENCOUNTER
Patient calling stating that she will be bringing a form from her work for pregnancy restrictions and wanted to know if she should have it faxed or brought with her. Patient advised either way, but our protocol is to have it completed, scanned and either faxed or ready for pick within 7 business day. Patient aware and appreciative of the help.

## 2017-04-28 ENCOUNTER — ROUTINE PRENATAL (OUTPATIENT)
Dept: OBGYN CLINIC | Age: 27
End: 2017-04-28

## 2017-04-28 VITALS
WEIGHT: 188 LBS | HEIGHT: 64 IN | BODY MASS INDEX: 32.1 KG/M2 | DIASTOLIC BLOOD PRESSURE: 70 MMHG | SYSTOLIC BLOOD PRESSURE: 114 MMHG

## 2017-04-28 DIAGNOSIS — Z34.80 PRENATAL CARE OF MULTIGRAVIDA, ANTEPARTUM: Primary | ICD-10-CM

## 2017-04-28 DIAGNOSIS — O26.12 POOR WEIGHT GAIN OF PREGNANCY, SECOND TRIMESTER: ICD-10-CM

## 2017-04-28 NOTE — PATIENT INSTRUCTIONS

## 2017-04-28 NOTE — MR AVS SNAPSHOT
Visit Information Date & Time Provider Department Dept. Phone Encounter #  
 4/28/2017  2:00 PM MD Samuel Briceno [phone] [id number] Follow-up Instructions Return in about 4 weeks (around 5/26/2017) for Follow up OB visit. Follow-up and Disposition History Your Appointments 5/25/2017  1:40 PM  
OB VISIT with MD Samuel Briceno ([facility]) Appt Note: 4wk fob TP  
 [address] [place] [address]  
[phone]  
  
   
 [address] [address]  
  
    
 6/21/2017  1:30 PM  
OB VISIT with MD Samuel Briceno ([facility]) Appt Note: 4wk fob w/ glucola TP  
 [address] [facility] [address] [place] [address]  
[phone]  
  
    
 7/7/2017  1:40 PM  
OB VISIT with MD Samuel Briceno ([facility]) Appt Note: 2wk fob TP  
 [address] [facility] [address] [place] [address]  
[phone] Upcoming Health Maintenance Date Due  
 HPV AGE 9Y-34Y (1 of 3 - Female 3 Dose Series) 12/6/2002 INFLUENZA AGE 9 TO ADULT 8/1/2016 PAP AKA CERVICAL CYTOLOGY 10/4/2019 Allergies as of 4/28/2017  Review Complete On: 4/28/2017 By: Kelvin Gómez MD  
 No Known Allergies Current Immunizations  Reviewed on 2/17/2016 Name Date DTaP 2/17/2013 Influenza Vaccine Whole 10/5/2011 Not reviewed this visit You Were Diagnosed With   
  
 Codes Comments Prenatal care of multigravida, antepartum    -  Primary ICD-10-CM: Z34.80 ICD-9-CM: V22.1 Poor weight gain of pregnancy, second trimester     ICD-10-CM: O26.12 
ICD-9-CM: 646.83 Vitals BP Height(growth percentile) Weight(growth percentile) LMP BMI OB Status 114/70 5' 4\" (1.626 m) 188 lb (85.3 kg) 09/08/2016 (Approximate) 32.27 kg/m2 Pregnant Smoking Status Never Smoker BMI and BSA Data Body Mass Index Body Surface Area  
 32.27 kg/m 2 1.96 m 2 Preferred Pharmacy Pharmacy Name Phone [id number] [address] [facility] [phone] Your Updated Medication List  
  
   
This list is accurate as of: 4/28/17  2:40 PM.  Always use your most recent med list.  
  
  
  
  
 Ursula Marcum 1/35 (28) 1-35 mg-mcg Tab Generic drug:  norethindrone-ethinyl estradiol Take 1 Tab by mouth daily. CLARITIN PO Take  by mouth. diphenhydrAMINE 25 mg capsule Commonly known as:  BENADRYL Take 50 mg by mouth every six (6) hours as needed. doxylamine-pyridoxine 10-10 mg Tbec Commonly known as:  Kristy Bride Take 2 Tabs by mouth nightly. promethazine 25 mg tablet Commonly known as:  PHENERGAN Take 1 Tab by mouth every six (6) hours as needed for Nausea. ZOFRAN PO Take  by mouth. Follow-up Instructions Return in about 4 weeks (around 5/26/2017) for Follow up OB visit. Patient Instructions Weeks 18 to 22 of Your Pregnancy: Care Instructions Your Care Instructions Your baby is continuing to develop quickly. At this stage, babies can now suck their thumbs,  firmly with their hands, and open and close their eyelids. Sometime between 18 and 22 weeks, you will start to feel your baby move. At first, these small fetal movements feel like fluttering or \"butterflies. \" Some women say that they feel like gas bubbles. As the baby grows, these movements will become stronger. You may also notice that your baby kicks and hiccups. During this time, you may find that your nausea and fatigue are gone. Overall, you may feel better and have more energy than you did in your first trimester. But you may also have new discomforts now, such as sleep problems or leg cramps. This care sheet can help you ease these discomforts. Follow-up care is a key part of your treatment and safety. Be sure to make and go to all appointments, and call your doctor if you are having problems. It's also a good idea to know your test results and keep a list of the medicines you take. How can you care for yourself at home? Ease sleep problems · Avoid caffeine in drinks or chocolate late in the day. · Get some exercise every day. · Take a warm shower or bath before bed. · Have a light snack or glass of milk at bedtime. · Do relaxation exercises in bed to calm your mind and body. · Support your legs and back with extra pillows. Try a pillow between your legs if you sleep on your side. · Do not use sleeping pills or alcohol. They could harm your baby. Ease leg cramps · Do not massage your calf during the cramp. · Sit on a firm bed or chair. Straighten your leg, and bend your foot (flex your ankle) slowly upward, toward your knee. Bend your toes up and down. · Stand on a cool, flat surface. Stretch your toes upward, and take small steps walking on your heels. · Use a heating pad or hot water bottle to help with muscle ache. Prevent leg cramps · Be sure to get enough calcium. If you are worried that you are not getting enough, talk to your doctor. · Exercise every day, and stretch your legs before bed. · Take a warm bath before bed, and try leg warmers at night. Where can you learn more? Go to http://richard-horacio.info/. Enter P299 in the search box to learn more about \"Weeks 18 to 22 of Your Pregnancy: Care Instructions. \" Current as of: May 30, 2016 Content Version: 11.2 © 8170-2940 [employer]. Care instructions adapted under license by [employer] (which disclaims liability or warranty for this information).  If you have questions about a medical condition or this instruction, always ask your healthcare professional. Enrique Lawrence[employer] Incorporated disclaims any warranty or liability for your use of this information. Introducing [facility]! Dear Boone Rockwell: Thank you for requesting a [employer] account. Our records indicate that you already have an active [employer] account. You can access your account anytime at [email or web address] Did you know that you can access your hospital and ER discharge instructions at any time in [employer]? You can also review all of your test results from your hospital stay or ER visit. Additional Information If you have questions, please visit the Frequently Asked Questions section of the [employer] website at [email or web address] Remember, [employer] is NOT to be used for urgent needs. For medical emergencies, dial 911. Now available from your iPhone and Android! Please provide this summary of care documentation to your next provider. Your primary care clinician is listed as TITI DUMONT. If you have any questions after today's visit, please call [phone].

## 2017-05-12 ENCOUNTER — TELEPHONE (OUTPATIENT)
Dept: OBGYN CLINIC | Age: 27
End: 2017-05-12

## 2017-05-12 NOTE — TELEPHONE ENCOUNTER
22w5d called requesting her prenatal records to be sent to the [address] [place] [address] [facility]. This nurse advised pt to either come here to sign a medical release form or go to the new office and sign one and have it faxed to this office. She verbalized understanding.     ALBERTO only

## 2017-09-22 ENCOUNTER — TELEPHONE (OUTPATIENT)
Dept: OBGYN CLINIC | Age: 27
End: 2017-09-22

## 2017-09-22 NOTE — TELEPHONE ENCOUNTER
Received incoming call from pt. Pt wanting to know if she had pertussis vaccine during her pregnancy. States she switched to our office around 30 weeks. Reports calling prior office and they stated they do not give pertussis vaccine. Pt advised per our record she has not but to check with previous office as pertussis is a component of the TDAP vaccine. Pt verbalized understanding will call back office.

## 2021-04-01 ENCOUNTER — TELEPHONE (OUTPATIENT)
Dept: FAMILY MEDICINE CLINIC | Age: 31
End: 2021-04-01

## 2021-04-01 ENCOUNTER — VIRTUAL VISIT (OUTPATIENT)
Dept: FAMILY MEDICINE CLINIC | Age: 31
End: 2021-04-01
Payer: COMMERCIAL

## 2021-04-01 DIAGNOSIS — Z13.6 ENCOUNTER FOR LIPID SCREENING FOR CARDIOVASCULAR DISEASE: ICD-10-CM

## 2021-04-01 DIAGNOSIS — F32.0 CURRENT MILD EPISODE OF MAJOR DEPRESSIVE DISORDER, UNSPECIFIED WHETHER RECURRENT (HCC): ICD-10-CM

## 2021-04-01 DIAGNOSIS — R53.83 FATIGUE, UNSPECIFIED TYPE: ICD-10-CM

## 2021-04-01 DIAGNOSIS — E66.9 OBESITY (BMI 30.0-34.9): ICD-10-CM

## 2021-04-01 DIAGNOSIS — Z00.00 ENCOUNTER FOR MEDICAL EXAMINATION TO ESTABLISH CARE: Primary | ICD-10-CM

## 2021-04-01 DIAGNOSIS — F41.9 ANXIETY: ICD-10-CM

## 2021-04-01 DIAGNOSIS — Z13.220 ENCOUNTER FOR LIPID SCREENING FOR CARDIOVASCULAR DISEASE: ICD-10-CM

## 2021-04-01 PROCEDURE — 99203 OFFICE O/P NEW LOW 30 MIN: CPT | Performed by: STUDENT IN AN ORGANIZED HEALTH CARE EDUCATION/TRAINING PROGRAM

## 2021-04-01 NOTE — TELEPHONE ENCOUNTER
----- Message from [place] sent at 4/1/2021  2:55 PM EDT -----  Regarding: Dr. Peyton Mcpherson  Patient return call    Caller's first and last name and relationship (if not the patient):  n/a      Best contact number(s):[phone]      Whose call is being returned:  Dr. Avalos Chiqui staff      Details to clarify the request:  Pt missed her check in phone call for her 3:15 pm vv appt for today. Requesting a call back to complete check in process.        [place]

## 2021-04-01 NOTE — PROGRESS NOTES
Bhargav Fairbanks  35 y.o. female  1990  [address]  [address]:    Telemedicine Progress Note  Roni Nieto DO       Encounter Date and Time: April 1, 2021 at 3:15 PM    Consent: Bhargav Fairbanks, who was seen by synchronous (real-time) audio-video technology, and/or her healthcare decision maker, is aware that this patient-initiated, Telehealth encounter on 4/1/2021 is a billable service, with coverage as determined by her insurance carrier. She is aware that she may receive a bill and has provided verbal consent to proceed: Yes. Chief Complaint   Patient presents with    Establish Care     History of Present Illness   Bhargav Fairbanks is a 35 y.o. female was evaluated by synchronous (real-time) audio-video technology from home, through a secure patient portal.    31yo with PMHx with anxiety here to establish care. Did not have a previous PCP, was seen by her OBGYN. Her only concern today is her mood and her weight. Anxiety/depression  - For the past 2-3 years  - Seeing a therapist, which helps  - Having a lot of stress recently and feeling overwhelmed. 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 Reports she recently started exercising 3-5x per week for the past 1-2 months but has not been able to lose weight  - Reports her diet was previously not great, but as of yesterday she started counting her calories         Review of Systems   Review of Systems   Constitutional: Negative for chills and fever. Cardiovascular: Negative for chest pain. Gastrointestinal: Negative for abdominal pain, nausea and vomiting.    Genitourinary: Negative for dysuria and urgency. Neurological: Negative for dizziness and headaches. Psychiatric/Behavioral: Positive for depression. Negative for substance abuse and suicidal ideas. The patient is nervous/anxious. Vitals/Objective:     General: alert, cooperative, no distress   Mental  status: mental status: alert, oriented to person, place, and time, normal mood, behavior, speech, dress, motor activity, and thought processes   Resp: resp: normal effort and no respiratory distress   Neuro: neuro: no gross deficits   Skin: skin: no discoloration or lesions of concern on visible areas   Due to this being a TeleHealth evaluation, many elements of the physical examination are unable to be assessed. Assessment and Plan:   Time-based coding, delete if not needed: I spent at least 15 minutes with this established patient, and >50% of the time was spent counseling and/or coordinating care regarding her mood symptoms and her obesity    1. Encounter for medical examination to establish care  - This provider to be patient's PCP. Only previous doctor was her OBGYN  - METABOLIC PANEL, BASIC; Future    2. Anxiety  - Continue therapy. Discussed initiation of SSRI but patient not interested in taking a medication at this time. Will continue to monitor and follow up in 4 weeks. 3. Obesity (BMI 30.0-34.9)  - Counseled on diet and exercise. - Will follow up in 4 weeks regarding progress with weight loss, if no significant improvement may consider dietician referral  - METABOLIC PANEL, BASIC; Future  - LIPID PANEL; Future  - HEMOGLOBIN A1C WITH EAG; Future    4. Current mild episode of major depressive disorder, unspecified whether recurrent ([address]  - same as above  - TSH 3RD GENERATION; Future    5. Fatigue, unspecified type  - TSH 3RD GENERATION; Future  - CBC W/O DIFF; Future  - VITAMIN D, 25 HYDROXY; Future    6. Encounter for lipid screening for cardiovascular disease  - LIPID PANEL;  Future         We discussed the expected course, resolution and complications of the diagnosis(es) in detail. Medication risks, benefits, costs, interactions, and alternatives were discussed as indicated. I advised her to contact the office if her condition worsens, changes or fails to improve as anticipated. She expressed understanding with the diagnosis(es) and plan. Patient understands that this encounter was a temporary measure, and the importance of further follow up and examination was emphasized. Patient verbalized understanding. Patient informed to follow up: 4 weeks for mood and weight gain. Electronically Signed: Ubaldo Sanders DO    CPT Codes 60678-47258 for Established Patients may apply to this Telehealth Visit. POS code: 18. Modifier BOGDAN Cantrell is a 35 y.o. female who was evaluated by an audio-video encounter for concerns as above. Patient identification was verified prior to start of the visit. A caregiver was present when appropriate. Due to this being a TeleHealth encounter (During [place] [address] public health emergency), evaluation of the following organ systems was limited: Vitals/Constitutional/EENT/Resp/CV/GI//MS/Neuro/Skin/Heme-Lymph-Imm. Pursuant to the emergency declaration under the [facility], 1135 waiver authority and the [employer] and [employer] Act, this Virtual Visit was conducted, with patient's (and/or legal guardian's) consent, to reduce the patient's risk of exposure to COVID-19 and provide necessary medical care. Services were provided through a synchronous discussion virtually to substitute for in-person clinic visit. I was at home. The patient was at home. History   Patients past medical, surgical and family histories were reviewed and updated.       Past Medical History:   Diagnosis Date    Abnormal Pap smear of cervix 09/01/2015    ASCUS, +HPV - repap 1 year 2016    Anemia NEC     Encounter for insertion of mirena IUD 1/19/12    3 years ago    Encounter for IUD removal 08/04/2015    HX OTHER MEDICAL     weidom teeth removed    Hypotension     Pap smear for cervical cancer screening 10/04/2016    negative -  repap 1 yr 2017     Past Surgical History:   Procedure Laterality Date    HX HEENT       Family History   Problem Relation Age of Onset    Hypertension Mother     Hypertension Maternal Grandmother     Hypertension Maternal Grandfather     Diabetes Maternal Grandfather      Social History     Tobacco Use    Smoking status: Never Smoker    Smokeless tobacco: Never Used   Substance Use Topics    Alcohol use: No    Drug use: No     Patient Active Problem List   Diagnosis Code    Supervision of other normal pregnancy, antepartum Z34.80    Obesity (BMI 30.0-34. 9) E66.9    Anxiety F41.9          Current Medications/Allergies   Medications and Allergies reviewed:    Current Outpatient Medications   Medication Sig Dispense Refill    LORATADINE (CLARITIN PO) Take  by mouth.  ONDANSETRON HCL (ZOFRAN PO) Take  by mouth.  doxylamine-pyridoxine (DICLEGIS) 10-10 mg TbEC Take 2 Tabs by mouth nightly. 100 Tab 1    promethazine (PHENERGAN) 25 mg tablet Take 1 Tab by mouth every six (6) hours as needed for Nausea. 30 Tab 0    ALYACEN 1/35, 28, 1-35 mg-mcg tab Take 1 Tab by mouth daily. 90 Tab 4    diphenhydrAMINE (BENADRYL) 25 mg capsule Take 50 mg by mouth every six (6) hours as needed.        No Known Allergies

## 2021-04-07 ENCOUNTER — TELEPHONE (OUTPATIENT)
Dept: FAMILY MEDICINE CLINIC | Age: 31
End: 2021-04-07

## 2021-04-07 NOTE — TELEPHONE ENCOUNTER
Called, pt states she will call back for appt after she check her schedule        DO NOLAN Reddy [facility]             Please schedule patient to follow up in 4 weeks, VV, for weight and mood symptoms with me. Thanks!

## 2021-04-13 ENCOUNTER — LAB ONLY (OUTPATIENT)
Dept: FAMILY MEDICINE CLINIC | Age: 31
End: 2021-04-13

## 2021-04-13 DIAGNOSIS — F32.0 CURRENT MILD EPISODE OF MAJOR DEPRESSIVE DISORDER, UNSPECIFIED WHETHER RECURRENT (HCC): ICD-10-CM

## 2021-04-13 DIAGNOSIS — Z00.00 ENCOUNTER FOR MEDICAL EXAMINATION TO ESTABLISH CARE: ICD-10-CM

## 2021-04-13 DIAGNOSIS — Z13.220 ENCOUNTER FOR LIPID SCREENING FOR CARDIOVASCULAR DISEASE: ICD-10-CM

## 2021-04-13 DIAGNOSIS — E66.9 OBESITY (BMI 30.0-34.9): ICD-10-CM

## 2021-04-13 DIAGNOSIS — Z13.6 ENCOUNTER FOR LIPID SCREENING FOR CARDIOVASCULAR DISEASE: ICD-10-CM

## 2021-04-13 DIAGNOSIS — R53.83 FATIGUE, UNSPECIFIED TYPE: ICD-10-CM

## 2021-04-13 LAB
25(OH)D3 SERPL-MCNC: 10.5 NG/ML (ref 30–100)
ANION GAP SERPL CALC-SCNC: 6 MMOL/L (ref 5–15)
BUN SERPL-MCNC: 10 MG/DL (ref 6–20)
BUN/CREAT SERPL: 14 (ref 12–20)
CALCIUM SERPL-MCNC: 9.1 MG/DL (ref 8.5–10.1)
CHLORIDE SERPL-SCNC: 107 MMOL/L (ref 97–108)
CHOLEST SERPL-MCNC: 148 MG/DL
CO2 SERPL-SCNC: 26 MMOL/L (ref 21–32)
CREAT SERPL-MCNC: 0.72 MG/DL (ref 0.55–1.02)
ERYTHROCYTE [DISTWIDTH] IN BLOOD BY AUTOMATED COUNT: 14.4 % (ref 11.5–14.5)
EST. AVERAGE GLUCOSE BLD GHB EST-MCNC: 108 MG/DL
GLUCOSE SERPL-MCNC: 94 MG/DL (ref 65–100)
HBA1C MFR BLD: 5.4 % (ref 4–5.6)
HCT VFR BLD AUTO: 36.3 % (ref 35–47)
HDLC SERPL-MCNC: 44 MG/DL
HDLC SERPL: 3.4 {RATIO} (ref 0–5)
HGB BLD-MCNC: 10.9 G/DL (ref 11.5–16)
LDLC SERPL CALC-MCNC: 94.6 MG/DL (ref 0–100)
LIPID PROFILE,FLP: NORMAL
MCH RBC QN AUTO: 27.5 PG (ref 26–34)
MCHC RBC AUTO-ENTMCNC: 30 G/DL (ref 30–36.5)
MCV RBC AUTO: 91.4 FL (ref 80–99)
NRBC # BLD: 0 K/UL (ref 0–0.01)
NRBC BLD-RTO: 0 PER 100 WBC
PLATELET # BLD AUTO: 240 K/UL (ref 150–400)
PMV BLD AUTO: 10.9 FL (ref 8.9–12.9)
POTASSIUM SERPL-SCNC: 4.1 MMOL/L (ref 3.5–5.1)
RBC # BLD AUTO: 3.97 M/UL (ref 3.8–5.2)
SODIUM SERPL-SCNC: 139 MMOL/L (ref 136–145)
TRIGL SERPL-MCNC: 47 MG/DL (ref ?–150)
TSH SERPL DL<=0.05 MIU/L-ACNC: 0.65 UIU/ML (ref 0.36–3.74)
VLDLC SERPL CALC-MCNC: 9.4 MG/DL
WBC # BLD AUTO: 6.4 K/UL (ref 3.6–11)

## 2021-04-17 DIAGNOSIS — E55.9 VITAMIN D DEFICIENCY: Primary | ICD-10-CM

## 2021-04-17 DIAGNOSIS — D64.9 ANEMIA, UNSPECIFIED TYPE: ICD-10-CM

## 2021-04-17 RX ORDER — FERROUS SULFATE 325(65) MG
325 TABLET, DELAYED RELEASE (ENTERIC COATED) ORAL DAILY
Qty: 90 TAB | Refills: 0 | Status: SHIPPED | OUTPATIENT
Start: 2021-04-17 | End: 2022-05-16

## 2021-04-17 RX ORDER — ERGOCALCIFEROL 1.25 MG/1
50000 CAPSULE ORAL
Qty: 8 CAP | Refills: 0 | Status: SHIPPED | OUTPATIENT
Start: 2021-04-17

## 2021-04-17 NOTE — PROGRESS NOTES
Vit D 10.5, will start 50,000U weekly for 6-8 weeks and recheck  CBC with Hg slightly low at 10.9, will start iron daily   HgA1C, lipid panel, TSH, BMP ok

## 2021-04-23 ENCOUNTER — TELEPHONE (OUTPATIENT)
Dept: FAMILY MEDICINE CLINIC | Age: 31
End: 2021-04-23

## 2021-04-23 NOTE — TELEPHONE ENCOUNTER
----- Message from Abdi Lozada DO sent at 4/17/2021  9:14 AM EDT -----  Regarding: tickler for lab visit  Please place tickler to call patient for lab visit mid June. Thanks!

## 2021-04-28 ENCOUNTER — TELEPHONE (OUTPATIENT)
Dept: FAMILY MEDICINE CLINIC | Age: 31
End: 2021-04-28

## 2021-04-28 NOTE — TELEPHONE ENCOUNTER
----- Message from Fer Rodgers sent at 4/23/2021 10:54 AM EDT -----  Regarding: Dr. Kristel Ku: [phone]  Patient return call    Caller's first and last name and relationship (if not the patient): N/A      Best contact number(s):[phone]      Whose call is being returned: Office       Details to clarify the request: Missed call to schedule labs, please contact pt. Tiara L Toussaint

## 2021-04-29 ENCOUNTER — TELEPHONE (OUTPATIENT)
Dept: FAMILY MEDICINE CLINIC | Age: 31
End: 2021-04-29

## 2021-04-29 NOTE — TELEPHONE ENCOUNTER
Why is an appt in the office needed as labs were part of the virtual appt which you wanted done? There should be a phone call or letter sent to discuss them as being part of that virtual appt.

## 2021-04-29 NOTE — TELEPHONE ENCOUNTER
----- Message from [employer] sent at 4/26/2021 11:49 AM EDT -----  Regarding: Dr. Clementine Crockett Message/Vendor Calls    Caller's first and last name: Kana Jacobson      Reason for call: schedule appt      Callback required yes/no and why: yes/pt request      Best contact number(s): [phone]      Details to clarify the request: pt would like to schedule an in-office visit to discuss labs completed on 04/13/2021/pt states that someone from the practice was supposed to reach out to schedule but she has not heard from anyone      [employer]

## 2021-05-07 ENCOUNTER — VIRTUAL VISIT (OUTPATIENT)
Dept: FAMILY MEDICINE CLINIC | Age: 31
End: 2021-05-07
Payer: COMMERCIAL

## 2021-05-07 DIAGNOSIS — Z71.3 ENCOUNTER FOR WEIGHT LOSS COUNSELING: ICD-10-CM

## 2021-05-07 DIAGNOSIS — E66.9 OBESITY (BMI 30.0-34.9): ICD-10-CM

## 2021-05-07 DIAGNOSIS — G43.009 MIGRAINE WITHOUT AURA AND WITHOUT STATUS MIGRAINOSUS, NOT INTRACTABLE: Primary | ICD-10-CM

## 2021-05-07 PROCEDURE — 99214 OFFICE O/P EST MOD 30 MIN: CPT | Performed by: STUDENT IN AN ORGANIZED HEALTH CARE EDUCATION/TRAINING PROGRAM

## 2021-05-07 RX ORDER — SUMATRIPTAN 50 MG/1
50 TABLET, FILM COATED ORAL
Qty: 1 TAB | Refills: 0 | Status: SHIPPED | OUTPATIENT
Start: 2021-05-07 | End: 2021-05-07

## 2021-05-07 NOTE — Clinical Note
Please schedule follow up with me (VV if able at the beginning or end of a clinic day) about 4 weeks from now for migraines and weight management. Thanks!

## 2021-05-07 NOTE — PROGRESS NOTES
Nirali Rodriguez  35 y.o. female  1990  [address]  [address]:    Telemedicine Progress Note  Andrew Dover DO       Encounter Date and Time: May 7, 2021 at 7:56 AM    Consent: Nirali Rodriguez, who was seen by synchronous (real-time) audio-video technology, and/or her healthcare decision maker, is aware that this patient-initiated, Telehealth encounter on 5/7/2021 is a billable service, with coverage as determined by her insurance carrier. She is aware that she may receive a bill and has provided verbal consent to proceed: Yes. Chief Complaint   Patient presents with    Weight Management    Migraine     History of Present Illness   Nirali Rodriguez is a 35 y.o. female was evaluated by synchronous (real-time) audio-video technology from home, through a secure patient portal.    Weight management (follow up)  - Weight is 213 lb, same as last visit 4 weeks ago  - Working overtime and working a part time job, so unable to exercise as much as she would like  - Diet: does not eat any sugar, only drinks water, no sodas. Does eat out frequently with her being so busy. Recent meal was shrimp and grits  - TSH, BMP, A1C all wnl at last visit     Migraines  - Present for >10 years  - + Photophobia and phonophobia  - + N, no V  - No aura  - Occurring 2-3x/ month and are debilitating when they do occur. Were more frequent when she was on OCPs but now are less frequent  - No relief with tylenol or ibuprofen     Review of Systems   Review of Systems   Constitutional: Negative for chills and fever. HENT: Negative for sore throat. Respiratory: Negative for cough and shortness of breath. Cardiovascular: Negative for chest pain. Gastrointestinal: Negative for abdominal pain, constipation and vomiting. Neurological: Positive for headaches. Negative for dizziness, speech change and focal weakness.    Psychiatric/Behavioral: Negative for depression. The patient is not nervous/anxious. Vitals/Objective:     General: alert, cooperative, no distress   Mental  status: mental status: alert, oriented to person, place, and time, normal mood, behavior, speech, dress, motor activity, and thought processes   Resp: resp: normal effort and no respiratory distress   Neuro: neuro: no gross deficits   Skin: skin: no discoloration or lesions of concern on visible areas   Due to this being a TeleHealth evaluation, many elements of the physical examination are unable to be assessed. Assessment and Plan:   Time-based coding, delete if not needed: I spent at least 15 minutes with this established patient, and >50% of the time was spent counseling and/or coordinating care regarding her migraines and weight management    1. Migraine without aura and without status migrainosus, not intractable: Discussed prophylactic and abortive medications. Will hold off on prophylactic medications at this time due to low frequency of headaches  - SUMAtriptan (IMITREX) 50 mg tablet; Take 1 Tab by mouth once as needed for Migraine for up to 1 dose. You may take one more dose 2 hours later if your migraine is still present. Do not take more than 2 tabs in 24 hours. Dispense: 1 Tab; Refill: 0    2. Obesity (BMI 30.0-34.9)  - See below    3. Encounter for weight loss counseling  - Counseled on diet/exercise  - Encouraged to keep log of what she is eating. Also use smaller plates for portion control. Try cutting down fats and eating out. Encouraged increased fruits/vegetables and lean proteins. Also discussed possible referral to dietician in the future if no improvement in weight in above recommendations  - Recommended incorporating exercise at least 3 times per week  - Follow up in 4 weeks           We discussed the expected course, resolution and complications of the diagnosis(es) in detail.   Medication risks, benefits, costs, interactions, and alternatives were discussed as indicated. I advised her to contact the office if her condition worsens, changes or fails to improve as anticipated. She expressed understanding with the diagnosis(es) and plan. Patient understands that this encounter was a temporary measure, and the importance of further follow up and examination was emphasized. Patient verbalized understanding. Patient informed to follow up: 4 weeks. Follow-up and Dispositions  ·   Return in about 4 weeks (around 6/4/2021) for migraines and weight. Routing History          Electronically Signed: Marah Horta DO    CPT Codes 15429-57398 for Established Patients may apply to this Telehealth Visit. POS code: 18. Modifier GT    Ubaldo Reed is a 35 y.o. female who was evaluated by an audio-video encounter for concerns as above. Patient identification was verified prior to start of the visit. A caregiver was present when appropriate. Due to this being a TeleHealth encounter (During [facility]- public [facility] emergency), evaluation of the following organ systems was limited: Vitals/Constitutional/EENT/Resp/CV/GI//MS/Neuro/Skin/Heme-Lymph-Imm. Pursuant to the emergency declaration under the [facility], 1135 waiver authority and the [employer] and [employer] Act, this Virtual Visit was conducted, with patient's (and/or legal guardian's) consent, to reduce the patient's risk of exposure to COVID-19 and provide necessary medical care. Services were provided through a synchronous discussion virtually to substitute for in-person clinic visit. I was at home. The patient was at home. History   Patients past medical, surgical and family histories were reviewed and updated.       Past Medical History:   Diagnosis Date    Abnormal Pap smear of cervix 09/01/2015    ASCUS, +HPV - repap 1 year 2016    Anemia NEC     Encounter for insertion of mirena IUD 1/19/12    3 years ago    Encounter for IUD removal 08/04/2015    HX OTHER MEDICAL     weidom teeth removed    Hypotension     Pap smear for cervical cancer screening 10/04/2016    negative -  repap 1 yr 2017     Past Surgical History:   Procedure Laterality Date    HX HEENT       Family History   Problem Relation Age of Onset    Hypertension Mother     Hypertension Maternal Grandmother     Hypertension Maternal Grandfather     Diabetes Maternal Grandfather      Social History     Tobacco Use    Smoking status: Never Smoker    Smokeless tobacco: Never Used   Substance Use Topics    Alcohol use: No    Drug use: No     Patient Active Problem List   Diagnosis Code    Supervision of other normal pregnancy, antepartum Z34.80    Obesity (BMI 30.0-34. 9) E66.9    Anxiety F41.9          Current Medications/Allergies   Medications and Allergies reviewed:    Current Outpatient Medications   Medication Sig Dispense Refill    SUMAtriptan (IMITREX) 50 mg tablet Take 1 Tab by mouth once as needed for Migraine for up to 1 dose. You may take one more dose 2 hours later if your migraine is still present. Do not take more than 2 tabs in 24 hours. 1 Tab 0    ergocalciferol (ERGOCALCIFEROL) 1,250 mcg (50,000 unit) capsule Take 1 Cap by mouth every seven (7) days. 8 Cap 0    ferrous sulfate (IRON) 325 mg (65 mg iron) EC tablet Take 1 Tab by mouth daily. 90 Tab 0    LORATADINE (CLARITIN PO) Take  by mouth.  ONDANSETRON HCL (ZOFRAN PO) Take  by mouth.  doxylamine-pyridoxine (DICLEGIS) 10-10 mg TbEC Take 2 Tabs by mouth nightly. 100 Tab 1    promethazine (PHENERGAN) 25 mg tablet Take 1 Tab by mouth every six (6) hours as needed for Nausea. 30 Tab 0    ALYACEN 1/35, 28, 1-35 mg-mcg tab Take 1 Tab by mouth daily. 90 Tab 4    diphenhydrAMINE (BENADRYL) 25 mg capsule Take 50 mg by mouth every six (6) hours as needed.        No Known Allergies

## 2021-05-11 ENCOUNTER — TELEPHONE (OUTPATIENT)
Dept: FAMILY MEDICINE CLINIC | Age: 31
End: 2021-05-11

## 2021-05-11 DIAGNOSIS — G43.009 MIGRAINE WITHOUT AURA AND WITHOUT STATUS MIGRAINOSUS, NOT INTRACTABLE: Primary | ICD-10-CM

## 2021-05-11 NOTE — PROGRESS NOTES
[address] Medicine Residency Attending Addendum:  Dr. Laura Dominguez, DO,  the patient and I were not physically present during this encounter. The resident and I are concurrently monitoring the patient care through appropriate telecommunication technology. I discussed the findings, assessment and plan with the resident and agree with the resident's findings and plan as documented in the resident's note.       Mago Severino MD

## 2021-05-13 RX ORDER — SUMATRIPTAN 50 MG/1
TABLET, FILM COATED ORAL
Qty: 20 TAB | Refills: 0 | Status: SHIPPED | OUTPATIENT
Start: 2021-05-13 | End: 2022-05-16 | Stop reason: SDUPTHER

## 2021-05-13 NOTE — TELEPHONE ENCOUNTER
Spoke with pharmacist at [facility]. They have received the patient's new Rx for sumatriptan and they are working on filling it.

## 2021-06-11 ENCOUNTER — OFFICE VISIT (OUTPATIENT)
Dept: FAMILY MEDICINE CLINIC | Age: 31
End: 2021-06-11
Payer: COMMERCIAL

## 2021-06-11 VITALS
SYSTOLIC BLOOD PRESSURE: 108 MMHG | WEIGHT: 214 LBS | DIASTOLIC BLOOD PRESSURE: 68 MMHG | OXYGEN SATURATION: 99 % | RESPIRATION RATE: 16 BRPM | BODY MASS INDEX: 36.54 KG/M2 | HEART RATE: 88 BPM | HEIGHT: 64 IN | TEMPERATURE: 98 F

## 2021-06-11 DIAGNOSIS — Z71.3 ENCOUNTER FOR WEIGHT LOSS COUNSELING: Primary | ICD-10-CM

## 2021-06-11 DIAGNOSIS — G43.009 MIGRAINE WITHOUT AURA AND WITHOUT STATUS MIGRAINOSUS, NOT INTRACTABLE: ICD-10-CM

## 2021-06-11 PROCEDURE — 99213 OFFICE O/P EST LOW 20 MIN: CPT | Performed by: STUDENT IN AN ORGANIZED HEALTH CARE EDUCATION/TRAINING PROGRAM

## 2021-06-11 NOTE — PATIENT INSTRUCTIONS
Learning About Healthy Weight  What is a healthy weight? A healthy weight is the weight at which you feel good about yourself and have energy for work and play. It's also one that lowers your risk for health problems. What can you do to stay at a healthy weight? It can be hard to stay at a healthy weight, especially when fast food, vending-machine snacks, and processed foods are so easy to find. And with your busy lifestyle, activity may be low on your list of things to do. But staying at a healthy weight may be easier than you think. Here are some dos and don'ts for staying at a healthy weight. Do eat healthy foods  The kinds of foods you eat have a big impact on both your weight and your health. Reaching and staying at a healthy weight is not about going on a diet. It's about making healthier food choices every day and changing your diet for good. Healthy eating means eating a variety of foods so that you get all the nutrients you need. Your body needs protein, carbohydrate, and fats for energy. They keep your heart beating, your brain active, and your muscles working. On most days, try to eat from each food group. This means eating a variety of:  · Whole grains, such as whole wheat breads and pastas. · Fruits and vegetables. · Dairy products, such as low-fat milk, yogurt, and cheese. · Lean proteins, such as all types of fish, chicken without the skin, and beans. Don't have too much or too little of one thing. All foods, if eaten in moderation, can be part of healthy eating. Even sweets can be okay. If your favorite foods are high in fat, salt, sugar, or calories, limit how often you eat them. Eat smaller servings, or look for healthy substitutes. Do watch what you eat  Many people eat more than their bodies need. Part of staying at a healthy weight means learning how much food you really need from day to day and not eating more than that.  Even with healthy foods, eating too much can make you gain weight. Having a well-balanced diet means that you eat enough, but not too much, and that your food gives you the nutrients you need to stay healthy. So listen to your body. Eat when you're hungry. Stop when you feel satisfied. It's a good idea to have healthy snacks ready for when you get hungry. Keep healthy snacks with you at work, in your car, and at home. If you have a healthy snack easily available, you'll be less likely to pick a candy bar or bag of chips from a vending machine instead. Some healthy snacks you might want to keep on hand are fruit, low-fat yogurt, string cheese, low-fat microwave popcorn, raisins and other dried fruit, nuts, whole wheat crackers, pretzels, carrots, celery sticks, and broccoli. Do some physical activity  A big part of reaching and staying at a healthy weight is being active. When you're active, you burn calories. This makes it easier to reach and stay at a healthy weight. When you're active on a regular basis, your body burns more calories, even when you're at rest. Being active helps you lose fat and build lean muscle. Try to be active for at least 1 hour every day. This may sound like a lot, but it's okay to be active in smaller blocks of time that add up to 1 hour a day. Any activity that makes your heart beat faster and keeps it there for a while counts. A brisk walk, run, or swim will get your heart beating faster. So will climbing stairs, shooting baskets, or cycling. Even some household chores like vacuuming and mowing the lawn will get your heart rate up. Pick activities that you enjoyones that make your heart beat faster, your muscles stronger, and your muscles and joints more flexible. If you find more than one thing you like doing, do them all. You don't have to do the same thing every day. Don't diet  Diets don't work. Diets are temporary. Because you give up so much when you diet, you may be hungry and think about food all the time.  And after you stop dieting, you also may overeat to make up for what you missed. Most people who diet end up gaining back the pounds they lostand more. Remember that healthy bodies come in lots of shapes and sizes. Everyone can get healthier by eating better and being more active. Where can you learn more? Go to http://www.gray.com/  Enter B909 in the search box to learn more about \"Learning About Healthy Weight. \"  Current as of: September 23, 2020               Content Version: 12.8  © 2006-2021 [employer]. Care instructions adapted under license by [employer] (which disclaims liability or warranty for this information). If you have questions about a medical condition or this instruction, always ask your healthcare professional. [address] any warranty or liability for your use of this information.

## 2021-06-11 NOTE — PROGRESS NOTES
[address] [facility] [address] [facility]    Office [phone], Fax [phone]    Subjective:     Chief Complaint   Patient presents with    Weight Management     Patient here today to follow up from virtual visit on 5/7/21 for migraines and weight loss conseling. Patient states the Imitrex helps, she just has to take it as soon as she gets the symptoms. History provided by patient     Jean Sloan is a 35 y.o. female presents for migraine follow up and weight loss counseling. HPI:  Weight management (follow up)  - Weight is 214 lb, roughly the same as what it was at her previous visit 4 weeks ago  - Working overtime and working a part time job, so does not have not time to focus on eating healthy and also unable to exercise as much as she would like  - Diet: Eats out a lot or quick frozen meals/pizzas or whatever her kids are eating (pastas, chicken tenders) because she does not feel like she has time to make healthy meals  - TSH, BMP, A1C all wnl in 4/2021     Migraines  - Present for >10 years  - + Photophobia and phonophobia. + N, no V. No aura  - Occurring 2-3x/ month and initially were debilitating when they occurred. Were more frequent when she was on OCPs but now are less frequent. Was prescribed imitrex at last visit and has had significant relief when she takes the medication at the onset of the migraines    Medication reviewed. Current Outpatient Medications:     SUMAtriptan (IMITREX) 50 mg tablet, Take 1 Tab by mouth once as needed for Migraine for up to 1 dose. You may take one more dose 2 hours later if your migraine is still present. Do not take more than 2 tabs in 24 hours. , Disp: 20 Tab, Rfl: 0    ferrous sulfate (IRON) 325 mg (65 mg iron) EC tablet, Take 1 Tab by mouth daily. , Disp: 90 Tab, Rfl: 0    ALYACEN 1/35, 28, 1-35 mg-mcg tab, Take 1 Tab by mouth daily. , Disp: 90 Tab, Rfl: 4    diphenhydrAMINE (BENADRYL) 25 mg capsule, Take 50 mg by mouth every six (6) hours as needed. , Disp: , Rfl:     ergocalciferol (ERGOCALCIFEROL) 1,250 mcg (50,000 unit) capsule, Take 1 Cap by mouth every seven (7) days. (Patient not taking: Reported on 6/11/2021), Disp: 8 Cap, Rfl: 0    LORATADINE (CLARITIN PO), Take  by mouth. (Patient not taking: Reported on 6/11/2021), Disp: , Rfl:     ONDANSETRON HCL (ZOFRAN PO), Take  by mouth. (Patient not taking: Reported on 6/11/2021), Disp: , Rfl:     doxylamine-pyridoxine (DICLEGIS) 10-10 mg TbEC, Take 2 Tabs by mouth nightly. (Patient not taking: Reported on 6/11/2021), Disp: 100 Tab, Rfl: 1    promethazine (PHENERGAN) 25 mg tablet, Take 1 Tab by mouth every six (6) hours as needed for Nausea. (Patient not taking: Reported on 6/11/2021), Disp: 30 Tab, Rfl: 0     Allergy reviewed.   No Known Allergies     Past Medical History:   Diagnosis Date    Abnormal Pap smear of cervix 09/01/2015    ASCUS, +HPV - repap 1 year 2016    Anemia NEC     Encounter for insertion of mirena IUD 1/19/12    3 years ago    Encounter for IUD removal 08/04/2015    HX OTHER MEDICAL     weidom teeth removed    Hypotension     Pap smear for cervical cancer screening 10/04/2016    negative -  repap 1 yr 2017     Social History     Socioeconomic History    Marital status: SINGLE     Spouse name: Not on file    Number of children: Not on file    Years of education: Not on file    Highest education level: Not on file   Tobacco Use    Smoking status: Never Smoker    Smokeless tobacco: Never Used   Vaping Use    Vaping Use: Never used   Substance and Sexual Activity    Alcohol use: No    Drug use: No    Sexual activity: Yes     Partners: Male     Birth control/protection: Pill, None     Social Determinants of Health     Financial Resource Strain:     Difficulty of Paying Living Expenses:    Food Insecurity:     Worried About Running Out of Food in the Last Year:     Ran Out of Food in the Last Year:    Transportation Needs:     Lack of Transportation (Medical):  Lack of Transportation (Non-Medical):    Physical Activity:     Days of Exercise per Week:     Minutes of Exercise per Session:    Stress:     Feeling of Stress :    Social Connections:     Frequency of Communication with Friends and Family:     Frequency of Social Gatherings with Friends and Family:     Attends Church Services:     Active Member of Clubs or Organizations:     Attends Club or Organization Meetings:     Marital Status:      Review of Systems   Constitutional: Negative for chills and fever. Eyes: Negative for blurred vision. Respiratory: Negative for cough and shortness of breath. Cardiovascular: Negative for chest pain and palpitations. Gastrointestinal: Negative for abdominal pain, nausea and vomiting. Neurological: Positive for headaches. Negative for dizziness, focal weakness and weakness. Objective:   Vitals - reviewed  Visit Vitals  /68 (BP 1 Location: Right arm, BP Patient Position: Sitting, BP Cuff Size: Adult long)   Pulse 88   Temp 98 °F (36.7 °C) (Temporal)   Resp 16   Ht 5' 4\" (1.626 m)   Wt 214 lb (97.1 kg)   SpO2 99%   BMI 36.73 kg/m²        Physical exam:   GEN: NAD. Alert. Well nourished. EYES:  Conjunctiva clear       LUNGS: Respirations unlabored; CTAB. no wheeze, rales, rhonchi   CARDIOVASCULAR: Regular, rate, and rhythm without murmurs, gallops or rubs   NEUROLOGIC:  No focal neurologic deficits. Strength and sensation grossly intact. MSK: FROM in all extremities (both passive and active). Good tone. No vertebral tenderness. EXT: Well perfused. No edema. No erythema. PSYCH: appropriate mood and affect. Good insight and judgement. Cooperative. SKIN: No obvious rashes or lesions. Assessment and orders:       ICD-10-CM ICD-9-CM    1. Encounter for weight loss counseling  Z71.3 V65.3 REFERRAL TO DIETITIAN   2.  Migraine without aura and without status migrainosus, not intractable  G43.009 346.10      Weight loss management  - Counseled on diet/exercise. Discussed quick/healthy meals to incorporate including prepackaged salads, frozen vegetables. Decrease carbohydrates and sugars as able. - Referral to dietician for further weight management counseling.   - Recommended incorporating exercise at least 3 times per week as able    Migraine  - Continue imitrex prn      Follow up in 6-8 weeks for weight loss management    Pt was discussed with Dr Sander Jerome (attending physician). I have reviewed patient medical and social history and medications. I have reviewed pertinent labs results and other data. I have discussed the diagnosis with the patient and the intended plan as seen in the above orders. The patient has received an after-visit summary and questions were answered concerning future plans. I have discussed medication side effects and warnings with the patient as well.     Giuliana Spear DO  Resident [address] [place] [address]  06/11/21

## 2021-06-11 NOTE — PROGRESS NOTES
Chief Complaint   Patient presents with    Weight Management     Patient here today to follow up from virtual visit on 5/7/21 for migraines and weight loss conseling. Patient states the Imitrex helps, she just has to take it as soon as she gets the symptoms. 1. Have you been to the ER, urgent care clinic since your last visit? Hospitalized since your last visit? no    2. Have you seen or consulted any other health care providers outside of the [address] [place] [address] since your last visit? Include any pap smears or colon screening.  no

## 2021-07-12 ENCOUNTER — APPOINTMENT (OUTPATIENT)
Dept: CT IMAGING | Age: 31
End: 2021-07-12
Attending: NURSE PRACTITIONER
Payer: COMMERCIAL

## 2021-07-12 ENCOUNTER — APPOINTMENT (OUTPATIENT)
Dept: ULTRASOUND IMAGING | Age: 31
End: 2021-07-12
Attending: NURSE PRACTITIONER
Payer: COMMERCIAL

## 2021-07-12 ENCOUNTER — HOSPITAL ENCOUNTER (EMERGENCY)
Age: 31
Discharge: HOSPICE | End: 2021-07-13
Attending: EMERGENCY MEDICINE
Payer: COMMERCIAL

## 2021-07-12 ENCOUNTER — APPOINTMENT (OUTPATIENT)
Dept: GENERAL RADIOLOGY | Age: 31
End: 2021-07-12
Attending: NURSE PRACTITIONER
Payer: COMMERCIAL

## 2021-07-12 DIAGNOSIS — B34.9 VIRAL SYNDROME: ICD-10-CM

## 2021-07-12 DIAGNOSIS — R50.9 FEVER, UNSPECIFIED FEVER CAUSE: Primary | ICD-10-CM

## 2021-07-12 LAB
ALBUMIN SERPL-MCNC: 3.9 G/DL (ref 3.5–5)
ALBUMIN/GLOB SERPL: 0.9 {RATIO} (ref 1.1–2.2)
ALP SERPL-CCNC: 90 U/L (ref 45–117)
ALT SERPL-CCNC: 14 U/L (ref 12–78)
ANION GAP SERPL CALC-SCNC: 8 MMOL/L (ref 5–15)
APPEARANCE UR: CLEAR
AST SERPL-CCNC: 8 U/L (ref 15–37)
BACTERIA URNS QL MICRO: NEGATIVE /HPF
BASOPHILS # BLD: 0 K/UL (ref 0–0.1)
BASOPHILS NFR BLD: 0 % (ref 0–1)
BILIRUB SERPL-MCNC: 0.3 MG/DL (ref 0.2–1)
BILIRUB UR QL: NEGATIVE
BUN SERPL-MCNC: 6 MG/DL (ref 6–20)
BUN/CREAT SERPL: 7 (ref 12–20)
CALCIUM SERPL-MCNC: 9.1 MG/DL (ref 8.5–10.1)
CHLORIDE SERPL-SCNC: 104 MMOL/L (ref 97–108)
CO2 SERPL-SCNC: 24 MMOL/L (ref 21–32)
COLOR UR: ABNORMAL
COMMENT, HOLDF: NORMAL
COVID-19 RAPID TEST, COVR: NOT DETECTED
CREAT SERPL-MCNC: 0.88 MG/DL (ref 0.55–1.02)
D DIMER PPP FEU-MCNC: 0.54 MG/L FEU (ref 0–0.65)
DIFFERENTIAL METHOD BLD: ABNORMAL
EOSINOPHIL # BLD: 0.3 K/UL (ref 0–0.4)
EOSINOPHIL NFR BLD: 3 % (ref 0–7)
EPITH CASTS URNS QL MICRO: ABNORMAL /LPF
ERYTHROCYTE [DISTWIDTH] IN BLOOD BY AUTOMATED COUNT: 13.3 % (ref 11.5–14.5)
FLUAV AG NPH QL IA: NEGATIVE
FLUBV AG NOSE QL IA: NEGATIVE
GLOBULIN SER CALC-MCNC: 4.3 G/DL (ref 2–4)
GLUCOSE SERPL-MCNC: 122 MG/DL (ref 65–100)
GLUCOSE UR STRIP.AUTO-MCNC: NEGATIVE MG/DL
HCG UR QL: NEGATIVE
HCT VFR BLD AUTO: 36.8 % (ref 35–47)
HGB BLD-MCNC: 11.4 G/DL (ref 11.5–16)
HGB UR QL STRIP: ABNORMAL
HYALINE CASTS URNS QL MICRO: ABNORMAL /LPF (ref 0–5)
IMM GRANULOCYTES # BLD AUTO: 0 K/UL (ref 0–0.04)
IMM GRANULOCYTES NFR BLD AUTO: 0 % (ref 0–0.5)
KETONES UR QL STRIP.AUTO: NEGATIVE MG/DL
LACTATE SERPL-SCNC: 1 MMOL/L (ref 0.4–2)
LEUKOCYTE ESTERASE UR QL STRIP.AUTO: NEGATIVE
LIPASE SERPL-CCNC: 118 U/L (ref 73–393)
LYMPHOCYTES # BLD: 1.1 K/UL (ref 0.8–3.5)
LYMPHOCYTES NFR BLD: 10 % (ref 12–49)
MCH RBC QN AUTO: 27.3 PG (ref 26–34)
MCHC RBC AUTO-ENTMCNC: 31 G/DL (ref 30–36.5)
MCV RBC AUTO: 88.2 FL (ref 80–99)
MONOCYTES # BLD: 0.6 K/UL (ref 0–1)
MONOCYTES NFR BLD: 6 % (ref 5–13)
NEUTS SEG # BLD: 8.2 K/UL (ref 1.8–8)
NEUTS SEG NFR BLD: 81 % (ref 32–75)
NITRITE UR QL STRIP.AUTO: NEGATIVE
NRBC # BLD: 0 K/UL (ref 0–0.01)
NRBC BLD-RTO: 0 PER 100 WBC
PH UR STRIP: 8.5 [PH] (ref 5–8)
PLATELET # BLD AUTO: 241 K/UL (ref 150–400)
PMV BLD AUTO: 10.6 FL (ref 8.9–12.9)
POTASSIUM SERPL-SCNC: 3.5 MMOL/L (ref 3.5–5.1)
PROT SERPL-MCNC: 8.2 G/DL (ref 6.4–8.2)
PROT UR STRIP-MCNC: NEGATIVE MG/DL
RBC # BLD AUTO: 4.17 M/UL (ref 3.8–5.2)
RBC #/AREA URNS HPF: ABNORMAL /HPF (ref 0–5)
SAMPLES BEING HELD,HOLD: NORMAL
SODIUM SERPL-SCNC: 136 MMOL/L (ref 136–145)
SOURCE, COVRS: NORMAL
SP GR UR REFRACTOMETRY: 1.02 (ref 1–1.03)
TROPONIN I SERPL-MCNC: <0.05 NG/ML
UR CULT HOLD, URHOLD: NORMAL
UROBILINOGEN UR QL STRIP.AUTO: 1 EU/DL (ref 0.2–1)
WBC # BLD AUTO: 10.2 K/UL (ref 3.6–11)
WBC URNS QL MICRO: ABNORMAL /HPF (ref 0–4)

## 2021-07-12 PROCEDURE — 74011000250 HC RX REV CODE- 250: Performed by: NURSE PRACTITIONER

## 2021-07-12 PROCEDURE — 74011250637 HC RX REV CODE- 250/637: Performed by: NURSE PRACTITIONER

## 2021-07-12 PROCEDURE — 36415 COLL VENOUS BLD VENIPUNCTURE: CPT

## 2021-07-12 PROCEDURE — 76705 ECHO EXAM OF ABDOMEN: CPT

## 2021-07-12 PROCEDURE — 81001 URINALYSIS AUTO W/SCOPE: CPT

## 2021-07-12 PROCEDURE — 96375 TX/PRO/DX INJ NEW DRUG ADDON: CPT

## 2021-07-12 PROCEDURE — 93005 ELECTROCARDIOGRAM TRACING: CPT

## 2021-07-12 PROCEDURE — 80053 COMPREHEN METABOLIC PANEL: CPT

## 2021-07-12 PROCEDURE — 96361 HYDRATE IV INFUSION ADD-ON: CPT

## 2021-07-12 PROCEDURE — 74011250636 HC RX REV CODE- 250/636: Performed by: NURSE PRACTITIONER

## 2021-07-12 PROCEDURE — 81025 URINE PREGNANCY TEST: CPT

## 2021-07-12 PROCEDURE — 87635 SARS-COV-2 COVID-19 AMP PRB: CPT

## 2021-07-12 PROCEDURE — 96374 THER/PROPH/DIAG INJ IV PUSH: CPT

## 2021-07-12 PROCEDURE — 87804 INFLUENZA ASSAY W/OPTIC: CPT

## 2021-07-12 PROCEDURE — 85379 FIBRIN DEGRADATION QUANT: CPT

## 2021-07-12 PROCEDURE — 85025 COMPLETE CBC W/AUTO DIFF WBC: CPT

## 2021-07-12 PROCEDURE — 74011000636 HC RX REV CODE- 636: Performed by: RADIOLOGY

## 2021-07-12 PROCEDURE — 71275 CT ANGIOGRAPHY CHEST: CPT

## 2021-07-12 PROCEDURE — 83605 ASSAY OF LACTIC ACID: CPT

## 2021-07-12 PROCEDURE — 83690 ASSAY OF LIPASE: CPT

## 2021-07-12 PROCEDURE — 84484 ASSAY OF TROPONIN QUANT: CPT

## 2021-07-12 PROCEDURE — 71045 X-RAY EXAM CHEST 1 VIEW: CPT

## 2021-07-12 RX ORDER — KETOROLAC TROMETHAMINE 30 MG/ML
15 INJECTION, SOLUTION INTRAMUSCULAR; INTRAVENOUS
Status: COMPLETED | OUTPATIENT
Start: 2021-07-12 | End: 2021-07-12

## 2021-07-12 RX ADMIN — SODIUM CHLORIDE 1000 ML: 9 INJECTION, SOLUTION INTRAVENOUS at 22:22

## 2021-07-12 RX ADMIN — FAMOTIDINE 20 MG: 10 INJECTION, SOLUTION INTRAVENOUS at 22:23

## 2021-07-12 RX ADMIN — KETOROLAC TROMETHAMINE 15 MG: 30 INJECTION, SOLUTION INTRAMUSCULAR; INTRAVENOUS at 22:23

## 2021-07-12 RX ADMIN — IOPAMIDOL 80 ML: 755 INJECTION, SOLUTION INTRAVENOUS at 23:46

## 2021-07-12 RX ADMIN — ALUMINUM HYDROXIDE, MAGNESIUM HYDROXIDE, AND SIMETHICONE 40 ML: 200; 200; 20 SUSPENSION ORAL at 22:23

## 2021-07-12 NOTE — LETTER
[address]  [address] [facility] [address]  [phone]    Work/School Note    Date: 7/12/2021    To Whom It May concern:    Verenice Magallon was seen and treated today in the emergency room by the following provider(s):  Attending Provider: Evangelina Roberson DO  Nurse Practitioner: Vickie Tompkins NP.      Verenice Magallon may return to work on 7/18/21.     Sincerely,          Adrianne Bateman NP

## 2021-07-13 VITALS
OXYGEN SATURATION: 99 % | SYSTOLIC BLOOD PRESSURE: 127 MMHG | HEART RATE: 79 BPM | RESPIRATION RATE: 18 BRPM | DIASTOLIC BLOOD PRESSURE: 64 MMHG | TEMPERATURE: 98.7 F

## 2021-07-13 LAB
ATRIAL RATE: 110 BPM
CALCULATED P AXIS, ECG09: 54 DEGREES
CALCULATED R AXIS, ECG10: 49 DEGREES
CALCULATED T AXIS, ECG11: -3 DEGREES
DIAGNOSIS, 93000: NORMAL
P-R INTERVAL, ECG05: 136 MS
Q-T INTERVAL, ECG07: 322 MS
QRS DURATION, ECG06: 88 MS
QTC CALCULATION (BEZET), ECG08: 435 MS
VENTRICULAR RATE, ECG03: 110 BPM

## 2021-07-13 PROCEDURE — 99284 EMERGENCY DEPT VISIT MOD MDM: CPT

## 2021-07-13 PROCEDURE — 96361 HYDRATE IV INFUSION ADD-ON: CPT

## 2021-07-13 RX ORDER — FAMOTIDINE 10 MG/1
20 TABLET ORAL 2 TIMES DAILY
Qty: 40 TABLET | Refills: 0 | Status: SHIPPED | OUTPATIENT
Start: 2021-07-13 | End: 2021-07-23

## 2021-07-13 NOTE — ED PROVIDER NOTES
This is a 78-year-old female with a past medical history of anemia and COVID-19 who presents the ER today for evaluation of myalgias/arthralgias, fever (T-max 103), abdominal pain, decreased appetite, and shortness of breath that have been progressive in nature over the last several days. Patient denies any exposure to contacts with similar symptoms. She has been managing her fever with Tylenol and NSAIDs with some relief. She denies any headache, neck pain, sore throat, difficulty swallowing, vomiting, diarrhea, cough, leg swelling.            Past Medical History:   Diagnosis Date    Abnormal Pap smear of cervix 09/01/2015    ASCUS, +HPV - repap 1 year 2016    Anemia NEC     Encounter for insertion of mirena IUD 1/19/12    3 years ago    Encounter for IUD removal 08/04/2015    HX OTHER MEDICAL     weidom teeth removed    Hypotension     Pap smear for cervical cancer screening 10/04/2016    negative -  repap 1 yr 2017       Past Surgical History:   Procedure Laterality Date    HX HEENT           Family History:   Problem Relation Age of Onset    Hypertension Mother     Hypertension Maternal Grandmother     Hypertension Maternal Grandfather     Diabetes Maternal Grandfather        Social History     Socioeconomic History    Marital status: SINGLE     Spouse name: Not on file    Number of children: Not on file    Years of education: Not on file    Highest education level: Not on file   Occupational History    Not on file   Tobacco Use    Smoking status: Never Smoker    Smokeless tobacco: Never Used   Vaping Use    Vaping Use: Never used   Substance and Sexual Activity    Alcohol use: No    Drug use: No    Sexual activity: Yes     Partners: Male     Birth control/protection: Pill, None   Other Topics Concern    Not on file   Social History Narrative    Not on file     Social Determinants of Health     Financial Resource Strain:     Difficulty of Paying Living Expenses:    Food Insecurity:  Worried About [facility] in the Last Year:    [address] in the Last Year:    Transportation Needs:     Lack of Transportation (Medical):  Lack of Transportation (Non-Medical):    Physical Activity:     Days of Exercise per Week:     Minutes of Exercise per Session:    Stress:     Feeling of Stress :    Social Connections:     Frequency of Communication with Friends and Family:     Frequency of Social Gatherings with Friends and Family:     Attends [employer] Services:     Active Member of Clubs or Organizations:     Attends Club or Organization Meetings:     Marital Status:    Intimate Partner Violence:     Fear of Current or Ex-Partner:     Emotionally Abused:     Physically Abused:     Sexually Abused: ALLERGIES: Patient has no known allergies. Review of Systems   Constitutional: Positive for appetite change, chills, fatigue and fever. HENT: Positive for congestion. Negative for sore throat and trouble swallowing. Eyes: Negative for visual disturbance. Respiratory: Positive for shortness of breath. Cardiovascular: Positive for chest pain. Negative for palpitations. Gastrointestinal: Positive for abdominal pain and nausea. Negative for vomiting. Genitourinary: Negative for dysuria, flank pain and vaginal discharge. Musculoskeletal: Positive for arthralgias and myalgias. Skin: Negative for rash. Neurological: Negative for dizziness. Psychiatric/Behavioral: Negative for dysphoric mood. Vitals:    07/12/21 2019   BP: (!) 165/90   Pulse: (!) 105   Resp: 18   Temp: 99.7 °F (37.6 °C)   SpO2: 98%            Physical Exam  Vitals and nursing note reviewed. Constitutional:       General: She is not in acute distress. Appearance: Normal appearance. She is not diaphoretic. Comments: Appears uncomfortable   HENT:      Head: Normocephalic and atraumatic.       Right Ear: External ear normal.      Left Ear: External ear normal.      Nose: Nose normal.      Mouth/Throat:      Mouth: Mucous membranes are moist.      Pharynx: Oropharynx is clear. Eyes:      General: No scleral icterus. Extraocular Movements: Extraocular movements intact. Conjunctiva/sclera: Conjunctivae normal.      Pupils: Pupils are equal, round, and reactive to light. Cardiovascular:      Rate and Rhythm: Normal rate and regular rhythm. Pulses: Normal pulses. Radial pulses are 2+ on the right side and 2+ on the left side. Heart sounds: Normal heart sounds. No murmur heard. Pulmonary:      Effort: Pulmonary effort is normal.      Breath sounds: Normal breath sounds. Abdominal:      General: Abdomen is flat. Bowel sounds are normal.      Palpations: Abdomen is soft. Tenderness: There is abdominal tenderness in the epigastric area. Musculoskeletal:         General: Normal range of motion. Cervical back: Normal range of motion and neck supple. No rigidity. No muscular tenderness. Skin:     General: Skin is warm and dry. Coloration: Skin is not pale. Neurological:      General: No focal deficit present. Mental Status: She is alert and oriented to person, place, and time. Psychiatric:         Mood and Affect: Mood normal.         Behavior: Behavior normal.         Thought Content:  Thought content normal.         Judgment: Judgment normal.          MDM      VITAL SIGNS:  Patient Vitals for the past 4 hrs:   Temp Pulse Resp BP SpO2   07/13/21 0151 98.7 °F (37.1 °C) 79 18 127/64 99 %         LABS:  Recent Results (from the past 6 hour(s))   COVID-19 RAPID TEST    Collection Time: 07/12/21 10:22 PM   Result Value Ref Range    Specimen source Nasopharyngeal      COVID-19 rapid test Not detected NOTD     INFLUENZA A+B VIRAL AGS    Collection Time: 07/12/21 10:22 PM   Result Value Ref Range    Influenza A Antigen Negative NEG      Influenza B Antigen Negative NEG     D DIMER    Collection Time: 07/12/21 10:22 PM   Result Value Ref Range    D-dimer 0.54 0.00 - 0.65 mg/L FEU        IMAGING:  CTA CHEST W OR W WO CONT   Final Result   No acute process. XR CHEST PORT   Final Result   Clear lungs. US ABD LTD   Final Result   No acute process shown. Limited sonography as described. Medications During Visit:  Medications   sodium chloride 0.9 % bolus infusion 1,000 mL (has no administration in time range)   sodium chloride 0.9 % bolus infusion 1,000 mL (0 mL IntraVENous IV Completed 7/13/21 0156)   mylanta/viscous lidocaine (GI COCKTAIL) (40 mL Oral Given 7/12/21 2223)   famotidine (PF) (PEPCID) 20 mg in 0.9% sodium chloride 10 mL injection (20 mg IntraVENous Given 7/12/21 2223)   ketorolac (TORADOL) injection 15 mg (15 mg IntraVENous Given 7/12/21 2223)   iopamidoL (ISOVUE-370) 76 % injection 100 mL (80 mL IntraVENous Given 7/12/21 2346)         DECISION MAKING:  Cesar Churchill is a 35 y.o. female who comes in as above. Pt. Reports significant improvement in symptoms after famotidine, GI cocktail, Toradol, and IVF. Work-up negative for acute process. Suspect viral syndrome that is not due to COVID-19 or influenza. Plan:  Famotidine for stomach irritation. Tylenol for fever and body aches. PCP follow-up. The clinical decision making for this encounter included ordering and interpreting the above diagnostic tests with comparison to prior studies that are within our EMR. Past medical and surgical histories were reviewed, as were records from recent outpatient and emergency department visits. The above results discussed and reviewed with the patient. Patient verbalized understanding of the care plan, including any changes to current outpatient medication regimen, discussed disease process, symptom control, and follow-up care. Return precautions reviewed. IMPRESSION:  1. Fever, unspecified fever cause    2.  Viral syndrome        DISPOSITION:  Discharged      Discharge Medication List as of 7/13/2021 1:02 AM      START taking these medications    Details   famotidine (PEPCID) 10 mg tablet Take 2 Tablets by mouth two (2) times a day for 10 days. , Normal, Disp-40 Tablet, R-0         CONTINUE these medications which have NOT CHANGED    Details   SUMAtriptan (IMITREX) 50 mg tablet Take 1 Tab by mouth once as needed for Migraine for up to 1 dose. You may take one more dose 2 hours later if your migraine is still present. Do not take more than 2 tabs in 24 hours. , Normal, Disp-20 Tab, R-0      ergocalciferol (ERGOCALCIFEROL) 1,250 mcg (50,000 unit) capsule Take 1 Cap by mouth every seven (7) days. , Normal, Disp-8 Cap, R-0      ferrous sulfate (IRON) 325 mg (65 mg iron) EC tablet Take 1 Tab by mouth daily. , Normal, Disp-90 Tab, R-0      LORATADINE (CLARITIN PO) Take  by mouth., Historical Med      ONDANSETRON HCL (ZOFRAN PO) Take  by mouth., Historical Med      doxylamine-pyridoxine (DICLEGIS) 10-10 mg TbEC Take 2 Tabs by mouth nightly., Normal, Disp-100 Tab, R-1      promethazine (PHENERGAN) 25 mg tablet Take 1 Tab by mouth every six (6) hours as needed for Nausea., Normal, Disp-30 Tab, R-0      ALYACEN 1/35, 28, 1-35 mg-mcg tab Take 1 Tab by mouth daily. , Normal, Disp-90 Tab, R-4, KELLI      diphenhydrAMINE (BENADRYL) 25 mg capsule Take 50 mg by mouth every six (6) hours as needed., Historical Med              Follow-up Information     Follow up With Specialties Details Why Contact Info    Fátima Suarez DO Family Medicine Schedule an appointment as soon as possible for a visit   [address] [place] [address]  [phone]              The patient is asked to follow-up with their primary care provider in the next several days. They are to call tomorrow for an appointment. The patient is asked to return promptly for any increased concerns or worsening of symptoms. They can return to this emergency department or any other emergency department.

## 2021-07-13 NOTE — ED TRIAGE NOTES
Patient arrives ambulatory from home with CC of \"everything hurts\" and shortness of breath x1 day. Patient endorses a fever of 103 at home, taking Tylenol at home. +headache.+nausea. Denies chest pain. Patient had COVID in May and has been vaccinated. Patient anxious and tearful in triage.

## 2021-07-13 NOTE — ED NOTES
Discharge instructions given to patient by nurse. Pt had given counseling on medication use and verbalizes understanding. IV d/c. Pt ambulated off unit in no signs of distress.

## 2021-07-13 NOTE — DISCHARGE INSTRUCTIONS
Your scans and labs look reassuring. You tested negative for both COVID-19 and the flu. You most likely have a viral infection, in which case your symptoms should start to improve within just a few days. Continue to take ibuprofen and Tylenol for fevers and body aches. Try using famotidine for stomach discomfort.

## 2021-07-30 ENCOUNTER — APPOINTMENT (OUTPATIENT)
Dept: CARDIAC REHAB | Age: 31
End: 2021-07-30

## 2021-08-19 ENCOUNTER — TELEPHONE (OUTPATIENT)
Dept: CARDIAC REHAB | Age: 31
End: 2021-08-19

## 2021-08-19 NOTE — TELEPHONE ENCOUNTER
TELEPHONE ENCOUNTER: OP NUTRITION APPOINTMENT     Reminder call placed on 8/19/2021    Outcome:                 [x] Patient confirmed appointment              [] Patient rescheduled appointment              [] Unable to reach              [] Left message              [] Other:        Colin Mcgrath RD

## 2021-08-23 ENCOUNTER — HOSPITAL ENCOUNTER (OUTPATIENT)
Dept: CARDIAC REHAB | Age: 31
Discharge: HOME | End: 2021-08-23
Payer: COMMERCIAL

## 2021-08-23 VITALS — WEIGHT: 211.4 LBS | BODY MASS INDEX: 36.29 KG/M2

## 2021-08-23 PROCEDURE — 97802 MEDICAL NUTRITION INDIV IN: CPT | Performed by: DIETITIAN, REGISTERED

## 2021-08-23 NOTE — PROGRESS NOTES
[address] NOTE  DATE: 2021      REFERRING PHYSICIAN:     NAME: Elodia Angeles : 1990 AGE: 35 y.o. GENDER: female    REASON FOR VISIT: weight loss counseling    PAST MEDICAL HISTORY:   Patient Active Problem List   Diagnosis Code    Supervision of other normal pregnancy, antepartum Z34.80    Obesity (BMI 30.0-34. 9) E66.9    Anxiety F41.9    Migraine without aura and without status migrainosus, not intractable G43.009     Per patient also has acid reflux, diet controlled  Migraines are debilitating, unable to eat, frequency varies from 3 times per week to once every 3 months      LABS:   Lab Results   Component Value Date/Time    Cholesterol, total 148 2021 09:16 AM    HDL Cholesterol 44 2021 09:16 AM    LDL, calculated 94.6 2021 09:16 AM    VLDL, calculated 9.4 2021 09:16 AM    Triglyceride 47 2021 09:16 AM    CHOL/HDL Ratio 3.4 2021 09:16 AM     Lab Results   Component Value Date/Time    Hemoglobin A1c 5.4 2021 09:16 AM       MEDICATIONS/SUPPLEMENTS:   [unfilled]  Prior to Admission medications    Medication Sig Start Date End Date Taking? Authorizing Provider   SUMAtriptan (IMITREX) 50 mg tablet Take 1 Tab by mouth once as needed for Migraine for up to 1 dose. You may take one more dose 2 hours later if your migraine is still present. Do not take more than 2 tabs in 24 hours. 21   Rupa Riff, DO   ergocalciferol (ERGOCALCIFEROL) 1,250 mcg (50,000 unit) capsule Take 1 Cap by mouth every seven (7) days. Patient not taking: Reported on 2021   Rupa Riff, DO   ferrous sulfate (IRON) 325 mg (65 mg iron) EC tablet Take 1 Tab by mouth daily. 21   Rupa Riff, DO   LORATADINE (CLARITIN PO) Take  by mouth. Patient not taking: Reported on 2021    Provider, Historical   ONDANSETRON HCL (ZOFRAN PO) Take  by mouth.   Patient not taking: Reported on 2021    Provider, Historical doxylamine-pyridoxine (DICLEGIS) 10-10 mg TbEC Take 2 Tabs by mouth nightly. Patient not taking: Reported on 6/11/2021 2/23/17   Tavia Craig MD   promethazine (PHENERGAN) 25 mg tablet Take 1 Tab by mouth every six (6) hours as needed for Nausea. Patient not taking: Reported on 6/11/2021 2/23/17   Tavia Craig MD   ALYACEN 1/35, 28, 1-35 mg-mcg tab Take 1 Tab by mouth daily. 10/4/16   Tavia Craig MD   diphenhydrAMINE (BENADRYL) 25 mg capsule Take 50 mg by mouth every six (6) hours as needed.     Provider, Historical       EXERCISE/PHYSICAL ACTIVITY:  Mostly sedentary, sometimes does squats or uses resistance bands while working, would like to do more but feels overwhelmed with multiple time demands    ALCOHOL / TOBACCO USE: alcohol social / rare (less than weekly); no tobacco use    SOCIAL HISTORY: Alyssa Burkett is  and has a 5year old and 1year old; she works from home 8 am to 4:30 pm 5 days per week    REPORTED WEIGHT HISTORY: states when started working from home in 2018 is when started gaining weight due to decreased activity; reports not including pregnancy usual weight was around 150 lbs, current weight is heaviest weight, including pregnancy        ANTHROPOMETRICS:    Ht Readings from Last 1 Encounters:   06/11/21 5' 4\" (1.626 m)      Wt Readings from Last 10 Encounters:   08/23/21 95.9 kg (211 lb 6.4 oz)   06/11/21 97.1 kg (214 lb)   04/28/17 85.3 kg (188 lb)   03/30/17 86.2 kg (190 lb)   03/28/17 86.4 kg (190 lb 6.4 oz)   03/01/17 84.4 kg (186 lb)   02/23/17 84.8 kg (187 lb)   01/31/17 88.9 kg (196 lb)   01/10/17 87.5 kg (193 lb)   10/04/16 87.1 kg (192 lb)      IBW:120 # +/- 10%  %IBW: 176 % +/- 10%    BMI: 36.3 kg/M2  Category: obese          NUTRITION ASSESSMENT:    FOOD ALLERGIES/INTOLERANCES: no known food allergies    24 HOUR DIET RECALL  Breakfast     Snack     Lunch  coffee   Snack     Dinner  chips   Snack  late [place] and ely Morales does the grocery shopping and cooking for her family. Her son has a selective pallet and will be starting feeding therap; this has limited what she eats as she caters to his preferences. States very common for her to go all day without eating - not hungry, fixing food for kids \"all day long\" and doesn't want what they eat and then no time to fix for herself. Prior to Mike would eat 3 meals per day because had time to fix food for herself while the kids were at school. Hopes this will be a bit better now that they are in school. Drinks water all day except on occasion has 1 cup coffee with cream. Dislikes juice, sugar. Used to enjoy snacking on chips but now can only eat a few as it makes her sick, reflux. Was eating out 4 to 5 times per week but trying to cut back. Plans to cook today cajun pasta (made with heavy cream, cheese, butter). NUTRITION DIAGNOSIS:  Obesity related to voiced barriers, nutrition related knowledge deficit and disordered eating behaviors as evidenced by pt voices incomplete understanding of nutrition guidelines for  weight loss, pt request for RD education and counseling and diet history. ESTIMATED ENERGY NEEDS:  1410-2485 for weight loss at rate of 1 to 2 lb per week    (using [address] with AF 1.2-1.3; - 400 to 1170)    NUTRITION INTERVENTION:  Nutrition 60 minute one-on-one education & goal setting with Samuel Hair with Dorene Cassette relevant labs compared to ideals.     Reviewed weight history and patient's verbalized weight goal as well as any real or perceived barriers to obtaining the goal. Collaborated with patient to set a specific weight goal.     Conducted a verbal diet recall and assessed for environmental, financial, psychosocial, physical and comorbidities that may impact the food and eating patterns / behaviors of [address] with patient to set specific nutrient goals as well as specific food / behavior changes that will help patient meet the overall goal of: weight loss    NUTRITION EDUCATION / HANDOUTS PROVIDED:  - Label reading for calories and serving size  - Mix and Match Healthy Meal Builder  - Academy of Nutrition and Dietetics patient education \"Weight Loss Tips\"  - Academy of Nutrition and Dietetics patient education \"Weight Management Cooking Tips\"  - blue plate for portion control      PATIENT GOALS:    Weight Goal:    Long Term Weight Goal: 150 lbs    Nutrition Goals:    Daily Recommendations:  Calories: 2211-2086 /day   3 meals / day (about 400 to 500 calories per meal)      - read and compare food labels  - can use Equate High Protein shake + fruit / veggie to = 400-500 calories for 1 to 2 meals per day  - can use a frozen meal (preferably under 800 mg sodium) + fruit / veggie to = 400 to 500 calories for 1 to 2 meals per day  - use the plate method for portion control of family dinners    Suggested patient bring recipes to follow-up for help with modification to reduce calories      Specific tips and techniques to facilitate compliance with above recommendations were provided and discussed. Elodia Angeles verbalized understanding. The patient was encouraged to contact me as needed.       Follow-up: in 4 weeks              Rico Gomez RD, [facility]

## 2021-09-16 ENCOUNTER — TELEPHONE (OUTPATIENT)
Dept: CARDIAC REHAB | Age: 31
End: 2021-09-16

## 2021-09-16 NOTE — TELEPHONE ENCOUNTER
TELEPHONE ENCOUNTER: OP NUTRITION APPOINTMENT     Reminder call placed on 9/16/2021    Outcome:                 [x] Patient confirmed appointment              [] Patient rescheduled appointment              [] Unable to reach              [] Left message              [] Other:        Alfreda Orta RD

## 2022-03-16 NOTE — TELEPHONE ENCOUNTER
Per call from [facility] pharmacy, RX need to be clarified for QTY    CALL [phone]             Disp Refills Start End    SUMAtriptan (IMITREX) 50 mg tablet 1 Tab 0 5/7/2021 5/7/2021    Sig - Route: Take 1 Tab by mouth once as needed for Migraine for up to 1 dose. You may take one more dose 2 hours later if your migraine is still present. Do not take more than 2 tabs in 24 hours.  - Oral    Sent to pharmacy as: SUMAtriptan 50 mg   tablet (IMITREX)    E-Prescribing Status: Receipt confirmed by     pharmacy (5/7/2021  8:16 AM EDT) Patient baseline mental status

## 2022-03-19 PROBLEM — F41.9 ANXIETY: Status: ACTIVE | Noted: 2021-04-01

## 2022-03-19 PROBLEM — Z34.80 SUPERVISION OF OTHER NORMAL PREGNANCY, ANTEPARTUM: Status: ACTIVE | Noted: 2017-03-07

## 2022-03-19 PROBLEM — E66.9 OBESITY (BMI 30.0-34.9): Status: ACTIVE | Noted: 2021-04-01

## 2022-03-19 PROBLEM — G43.009 MIGRAINE WITHOUT AURA AND WITHOUT STATUS MIGRAINOSUS, NOT INTRACTABLE: Status: ACTIVE | Noted: 2021-06-11

## 2022-04-13 ENCOUNTER — HOSPITAL ENCOUNTER (EMERGENCY)
Age: 32
Discharge: HOME | End: 2022-04-14
Attending: EMERGENCY MEDICINE
Payer: COMMERCIAL

## 2022-04-13 ENCOUNTER — APPOINTMENT (OUTPATIENT)
Dept: GENERAL RADIOLOGY | Age: 32
End: 2022-04-13
Attending: NURSE PRACTITIONER
Payer: COMMERCIAL

## 2022-04-13 VITALS
HEART RATE: 85 BPM | TEMPERATURE: 98.4 F | OXYGEN SATURATION: 98 % | DIASTOLIC BLOOD PRESSURE: 78 MMHG | SYSTOLIC BLOOD PRESSURE: 124 MMHG | RESPIRATION RATE: 20 BRPM

## 2022-04-13 DIAGNOSIS — R07.9 CHEST PAIN, UNSPECIFIED TYPE: Primary | ICD-10-CM

## 2022-04-13 DIAGNOSIS — R05.8 PRODUCTIVE COUGH: ICD-10-CM

## 2022-04-13 LAB
BASOPHILS # BLD: 0 K/UL (ref 0–0.1)
BASOPHILS NFR BLD: 0 % (ref 0–1)
COMMENT, HOLDF: NORMAL
DIFFERENTIAL METHOD BLD: ABNORMAL
EOSINOPHIL # BLD: 0.3 K/UL (ref 0–0.4)
EOSINOPHIL NFR BLD: 3 % (ref 0–7)
ERYTHROCYTE [DISTWIDTH] IN BLOOD BY AUTOMATED COUNT: 13.9 % (ref 11.5–14.5)
HCT VFR BLD AUTO: 34.1 % (ref 35–47)
HGB BLD-MCNC: 10.7 G/DL (ref 11.5–16)
IMM GRANULOCYTES # BLD AUTO: 0 K/UL (ref 0–0.04)
IMM GRANULOCYTES NFR BLD AUTO: 0 % (ref 0–0.5)
LYMPHOCYTES # BLD: 3.1 K/UL (ref 0.8–3.5)
LYMPHOCYTES NFR BLD: 29 % (ref 12–49)
MCH RBC QN AUTO: 28.2 PG (ref 26–34)
MCHC RBC AUTO-ENTMCNC: 31.4 G/DL (ref 30–36.5)
MCV RBC AUTO: 90 FL (ref 80–99)
MONOCYTES # BLD: 0.7 K/UL (ref 0–1)
MONOCYTES NFR BLD: 6 % (ref 5–13)
NEUTS SEG # BLD: 6.8 K/UL (ref 1.8–8)
NEUTS SEG NFR BLD: 62 % (ref 32–75)
NRBC # BLD: 0 K/UL (ref 0–0.01)
NRBC BLD-RTO: 0 PER 100 WBC
PLATELET # BLD AUTO: 233 K/UL (ref 150–400)
PMV BLD AUTO: 10.3 FL (ref 8.9–12.9)
RBC # BLD AUTO: 3.79 M/UL (ref 3.8–5.2)
SAMPLES BEING HELD,HOLD: NORMAL
WBC # BLD AUTO: 11 K/UL (ref 3.6–11)

## 2022-04-13 PROCEDURE — 93005 ELECTROCARDIOGRAM TRACING: CPT

## 2022-04-13 PROCEDURE — 83690 ASSAY OF LIPASE: CPT

## 2022-04-13 PROCEDURE — 80053 COMPREHEN METABOLIC PANEL: CPT

## 2022-04-13 PROCEDURE — 99285 EMERGENCY DEPT VISIT HI MDM: CPT

## 2022-04-13 PROCEDURE — 74011250636 HC RX REV CODE- 250/636: Performed by: NURSE PRACTITIONER

## 2022-04-13 PROCEDURE — 96375 TX/PRO/DX INJ NEW DRUG ADDON: CPT

## 2022-04-13 PROCEDURE — 71046 X-RAY EXAM CHEST 2 VIEWS: CPT

## 2022-04-13 PROCEDURE — 85025 COMPLETE CBC W/AUTO DIFF WBC: CPT

## 2022-04-13 PROCEDURE — 36415 COLL VENOUS BLD VENIPUNCTURE: CPT

## 2022-04-13 PROCEDURE — 96374 THER/PROPH/DIAG INJ IV PUSH: CPT

## 2022-04-13 RX ORDER — KETOROLAC TROMETHAMINE 30 MG/ML
30 INJECTION, SOLUTION INTRAMUSCULAR; INTRAVENOUS
Status: COMPLETED | OUTPATIENT
Start: 2022-04-13 | End: 2022-04-13

## 2022-04-13 RX ORDER — DEXAMETHASONE SODIUM PHOSPHATE 10 MG/ML
10 INJECTION INTRAMUSCULAR; INTRAVENOUS
Status: COMPLETED | OUTPATIENT
Start: 2022-04-13 | End: 2022-04-13

## 2022-04-13 RX ADMIN — DEXAMETHASONE SODIUM PHOSPHATE 10 MG: 10 INJECTION, SOLUTION INTRAMUSCULAR; INTRAVENOUS at 23:38

## 2022-04-13 RX ADMIN — KETOROLAC TROMETHAMINE 30 MG: 30 INJECTION, SOLUTION INTRAMUSCULAR; INTRAVENOUS at 23:38

## 2022-04-13 NOTE — Clinical Note
[address]  [address] [facility] [address]  [phone]    Work/School Note    Date: 4/13/2022    To Whom It May concern:    Britt Boles was seen and treated today in the emergency room by the following provider(s):  Attending Provider: Laurent Calvin MD  Nurse Practitioner: Filomena Sanz NP.      Britt Boles is excused from work/school on 4/14/2022 through 4/16/2022. She is medically clear to return to work/school on 4/17/2022.          Sincerely,          Cyrus Gómez NP

## 2022-04-14 LAB
ALBUMIN SERPL-MCNC: 3.6 G/DL (ref 3.5–5)
ALBUMIN/GLOB SERPL: 0.9 {RATIO} (ref 1.1–2.2)
ALP SERPL-CCNC: 90 U/L (ref 45–117)
ALT SERPL-CCNC: 16 U/L (ref 12–78)
ANION GAP SERPL CALC-SCNC: 6 MMOL/L (ref 5–15)
AST SERPL-CCNC: 6 U/L (ref 15–37)
ATRIAL RATE: 84 BPM
BILIRUB SERPL-MCNC: 0.1 MG/DL (ref 0.2–1)
BUN SERPL-MCNC: 11 MG/DL (ref 6–20)
BUN/CREAT SERPL: 14 (ref 12–20)
CALCIUM SERPL-MCNC: 9.3 MG/DL (ref 8.5–10.1)
CALCULATED P AXIS, ECG09: 61 DEGREES
CALCULATED R AXIS, ECG10: 78 DEGREES
CALCULATED T AXIS, ECG11: 47 DEGREES
CHLORIDE SERPL-SCNC: 104 MMOL/L (ref 97–108)
CO2 SERPL-SCNC: 24 MMOL/L (ref 21–32)
CREAT SERPL-MCNC: 0.76 MG/DL (ref 0.55–1.02)
DIAGNOSIS, 93000: NORMAL
GLOBULIN SER CALC-MCNC: 4.2 G/DL (ref 2–4)
GLUCOSE SERPL-MCNC: 108 MG/DL (ref 65–100)
LIPASE SERPL-CCNC: 127 U/L (ref 73–393)
P-R INTERVAL, ECG05: 162 MS
POTASSIUM SERPL-SCNC: 3.6 MMOL/L (ref 3.5–5.1)
PROT SERPL-MCNC: 7.8 G/DL (ref 6.4–8.2)
Q-T INTERVAL, ECG07: 350 MS
QRS DURATION, ECG06: 88 MS
QTC CALCULATION (BEZET), ECG08: 413 MS
SODIUM SERPL-SCNC: 134 MMOL/L (ref 136–145)
VENTRICULAR RATE, ECG03: 84 BPM

## 2022-04-14 RX ORDER — METHYLPREDNISOLONE 4 MG/1
TABLET ORAL
Qty: 1 DOSE PACK | Refills: 0 | Status: SHIPPED | OUTPATIENT
Start: 2022-04-14 | End: 2022-05-16

## 2022-04-14 RX ORDER — AZITHROMYCIN 500 MG/1
TABLET, FILM COATED ORAL
Qty: 3 TABLET | Refills: 0 | Status: SHIPPED | OUTPATIENT
Start: 2022-04-14 | End: 2022-05-16

## 2022-04-14 RX ORDER — BENZONATATE 100 MG/1
100 CAPSULE ORAL
Qty: 21 CAPSULE | Refills: 0 | Status: SHIPPED | OUTPATIENT
Start: 2022-04-14 | End: 2022-04-21

## 2022-04-14 RX ORDER — IBUPROFEN 800 MG/1
800 TABLET ORAL
Qty: 20 TABLET | Refills: 0 | Status: SHIPPED | OUTPATIENT
Start: 2022-04-14 | End: 2022-04-21

## 2022-04-14 NOTE — ED PROVIDER NOTES
HPI     Cuate Nunez is a 28 y.o. female with Hx of anemia, ASCUS, IUD placement who presents ambulatory to [facility] ED with cc of chest burning, productive cough. Patient reports nonexertional, nonradiating diffuse burning chest pain with productive cough since yesterday. Patient states that her chest pain worsens after she has coughing episodes. Notes that she has congestion and a sore throat. She has not taken any medication PTA for s/sx. She reports that she just traveled back from [facility], no known sick exposures. Pt is most concerned that she has pneumonia. Denies F/C, N/V/D,difficulty breathing, abdominal pain, urinary concerns. Denies tobacco/ ETOH/ substance abuse. Denies chance of pregnancy. PCP: Pepe Andrade,     There are no other complaints, changes or physical findings at this time.         Past Medical History:   Diagnosis Date    Abnormal Pap smear of cervix 09/01/2015    ASCUS, +HPV - repap 1 year 2016    Anemia NEC     Encounter for insertion of mirena IUD 1/19/12    3 years ago    Encounter for IUD removal 08/04/2015    HX OTHER MEDICAL     weidom teeth removed    Hypotension     Pap smear for cervical cancer screening 10/04/2016    negative -  repap 1 yr 2017       Past Surgical History:   Procedure Laterality Date    HX HEENT           Family History:   Problem Relation Age of Onset    Hypertension Mother     Hypertension Maternal Grandmother     Hypertension Maternal Grandfather     Diabetes Maternal Grandfather        Social History     Socioeconomic History    Marital status: SINGLE     Spouse name: Not on file    Number of children: Not on file    Years of education: Not on file    Highest education level: Not on file   Occupational History    Not on file   Tobacco Use    Smoking status: Never Smoker    Smokeless tobacco: Never Used   Vaping Use    Vaping Use: Never used   Substance and Sexual Activity    Alcohol use: No    Drug use: No    Sexual activity: Yes     Partners: Male     Birth control/protection: Pill, None   Other Topics Concern    Not on file   Social History Narrative    Not on file     Social Determinants of Health     Financial Resource Strain:     Difficulty of Paying Living Expenses: Not on file   Food Insecurity:     Worried About Running Out of Food in the Last Year: Not on file    Kiley of Food in the Last Year: Not on file   Transportation Needs:     Lack of Transportation (Medical): Not on file    Lack of Transportation (Non-Medical): Not on file   Physical Activity:     Days of Exercise per Week: Not on file    Minutes of Exercise per Session: Not on file   Stress:     Feeling of Stress : Not on file   Social Connections:     Frequency of Communication with Friends and Family: Not on file    Frequency of Social Gatherings with Friends and Family: Not on file    Attends Jew Services: Not on file    Active Member of [address] [place] [address] [employer] or Organizations: Not on file    Attends Club or Organization Meetings: Not on file    Marital Status: Not on file   Intimate Partner Violence:     Fear of Current or Ex-Partner: Not on file    Emotionally Abused: Not on file    Physically Abused: Not on file    Sexually Abused: Not on file   Housing Stability:     Unable to Pay for Housing in the Last Year: Not on file    Number of [place] in the Last Year: Not on file    Unstable Housing in the Last Year: Not on file         ALLERGIES: Patient has no known allergies. Review of Systems   Constitutional: Negative for activity change, appetite change, chills and fever. HENT: Positive for congestion, rhinorrhea and sore throat. Negative for sinus pain. Eyes: Negative for visual disturbance. Respiratory: Positive for cough. Negative for shortness of breath. Cardiovascular: Positive for chest pain. Gastrointestinal: Negative for abdominal pain, diarrhea, nausea and vomiting.    Genitourinary: Negative for dysuria, flank pain, frequency and urgency. Musculoskeletal: Negative for arthralgias, back pain and neck pain. Skin: Negative for color change and rash. Neurological: Negative for dizziness, weakness, light-headedness and headaches. Psychiatric/Behavioral: Negative for agitation, behavioral problems and confusion. All other systems reviewed and are negative. There were no vitals filed for this visit. Physical Exam  Vitals and nursing note reviewed. Constitutional:       General: She is not in acute distress. Appearance: She is well-developed. HENT:      Head: Normocephalic and atraumatic. Right Ear: External ear normal.      Left Ear: External ear normal.      Nose: Nose normal.      Mouth/Throat:      Pharynx: No oropharyngeal exudate. Eyes:      Conjunctiva/sclera: Conjunctivae normal.      Pupils: Pupils are equal, round, and reactive to light. Cardiovascular:      Rate and Rhythm: Normal rate and regular rhythm. Heart sounds: Normal heart sounds. Pulmonary:      Effort: Pulmonary effort is normal.      Breath sounds: Normal breath sounds. Abdominal:      Palpations: Abdomen is soft. Musculoskeletal:         General: Normal range of motion. Cervical back: Normal range of motion and neck supple. Skin:     General: Skin is warm and dry. Neurological:      Mental Status: She is alert and oriented to person, place, and time. Psychiatric:         Behavior: Behavior normal.         Thought Content: Thought content normal.         Judgment: Judgment normal.          MDM  Number of Diagnoses or Management Options  Chest pain, unspecified type  Productive cough  Diagnosis management comments: DDx: URI, PNA, bronchitis     EKG was done without any acute or emergent findings per attending read. Chest x-ray is without pneumonia. The patient is not hypoxic or tachycardic. She does not have any lower extremity swelling. Chest pain is primarily posttussive.   We will start on antibiotics with medications to manage an upper respiratory infection. Patient was encouraged to follow-up with her primary care provider for ongoing management of symptoms. Patient was told that she could wear a mask to help isolate from family, should she become concerned about contagious nature of symptoms. Given a work note as well. Reasons to return to the ER were provided. Amount and/or Complexity of Data Reviewed  Clinical lab tests: ordered and reviewed  Tests in the radiology section of CPT®: ordered and reviewed  Review and summarize past medical records: yes           Procedures    LABORATORY TESTS:  Recent Results (from the past 12 hour(s))   EKG, 12 LEAD, INITIAL    Collection Time: 04/13/22 11:03 PM   Result Value Ref Range    Ventricular Rate 84 BPM    Atrial Rate 84 BPM    P-R Interval 162 ms    QRS Duration 88 ms    Q-T Interval 350 ms    QTC Calculation (Bezet) 413 ms    Calculated P Axis 61 degrees    Calculated R Axis 78 degrees    Calculated T Axis 47 degrees    Diagnosis       Normal sinus rhythm  Normal ECG  When compared with ECG of 12-JUL-2021 20:52,  Non-specific change in ST segment in Inferior leads  Nonspecific T wave abnormality has replaced inverted T waves in Inferior   leads  Nonspecific T wave abnormality no longer evident in Anterolateral leads     CBC WITH AUTOMATED DIFF    Collection Time: 04/13/22 11:11 PM   Result Value Ref Range    WBC 11.0 3.6 - 11.0 K/uL    RBC 3.79 (L) 3.80 - 5.20 M/uL    HGB 10.7 (L) 11.5 - 16.0 g/dL    HCT 34.1 (L) 35.0 - 47.0 %    MCV 90.0 80.0 - 99.0 FL    MCH 28.2 26.0 - 34.0 PG    MCHC 31.4 30.0 - 36.5 g/dL    RDW 13.9 11.5 - 14.5 %    PLATELET 010 689 - 829 K/uL    MPV 10.3 8.9 - 12.9 FL    NRBC 0.0 0  WBC    ABSOLUTE NRBC 0.00 0.00 - 0.01 K/uL    NEUTROPHILS 62 32 - 75 %    LYMPHOCYTES 29 12 - 49 %    MONOCYTES 6 5 - 13 %    EOSINOPHILS 3 0 - 7 %    BASOPHILS 0 0 - 1 %    IMMATURE GRANULOCYTES 0 0.0 - 0.5 %    ABS.  NEUTROPHILS 6.8 1.8 - 8.0 K/UL    ABS. LYMPHOCYTES 3.1 0.8 - 3.5 K/UL    ABS. MONOCYTES 0.7 0.0 - 1.0 K/UL    ABS. EOSINOPHILS 0.3 0.0 - 0.4 K/UL    ABS. BASOPHILS 0.0 0.0 - 0.1 K/UL    ABS. IMM. GRANS. 0.0 0.00 - 0.04 K/UL    DF AUTOMATED     METABOLIC PANEL, COMPREHENSIVE    Collection Time: 04/13/22 11:11 PM   Result Value Ref Range    Sodium 134 (L) 136 - 145 mmol/L    Potassium 3.6 3.5 - 5.1 mmol/L    Chloride 104 97 - 108 mmol/L    CO2 24 21 - 32 mmol/L    Anion gap 6 5 - 15 mmol/L    Glucose 108 (H) 65 - 100 mg/dL    BUN 11 6 - 20 MG/DL    Creatinine 0.76 0.55 - 1.02 MG/DL    BUN/Creatinine ratio 14 12 - 20      GFR est AA >60 >60 ml/min/1.73m2    GFR est non-AA >60 >60 ml/min/1.73m2    Calcium 9.3 8.5 - 10.1 MG/DL    Bilirubin, total 0.1 (L) 0.2 - 1.0 MG/DL    ALT (SGPT) 16 12 - 78 U/L    AST (SGOT) 6 (L) 15 - 37 U/L    Alk. phosphatase 90 45 - 117 U/L    Protein, total 7.8 6.4 - 8.2 g/dL    Albumin 3.6 3.5 - 5.0 g/dL    Globulin 4.2 (H) 2.0 - 4.0 g/dL    A-G Ratio 0.9 (L) 1.1 - 2.2     LIPASE    Collection Time: 04/13/22 11:11 PM   Result Value Ref Range    Lipase 127 73 - 393 U/L   SAMPLES BEING HELD    Collection Time: 04/13/22 11:11 PM   Result Value Ref Range    SAMPLES BEING HELD 1RED 1BLU     COMMENT        Add-on orders for these samples will be processed based on acceptable specimen integrity and analyte stability, which may vary by analyte. IMAGING RESULTS:  XR CHEST PA LAT   Final Result   No acute cardiopulmonary process. MEDICATIONS GIVEN:  Medications   ketorolac (TORADOL) injection 30 mg (30 mg IntraVENous Given 4/13/22 5646)   dexamethasone (PF) (DECADRON) 10 mg/mL injection 10 mg (10 mg IntraVENous Given 4/13/22 1208)       IMPRESSION:  1. Chest pain, unspecified type    2. Productive cough        PLAN:  1.    Discharge Medication List as of 4/14/2022 12:20 AM      START taking these medications    Details   methylPREDNISolone (Medrol, Remy,) 4 mg tablet Take by mouth as directed, Normal, Disp-1 Dose Pack, R-0      albuterol sulfate (PROAIR RESPICLICK) 90 mcg/actuation breath activated inhaler Take 2 Puffs by inhalation four (4) times daily as needed for Cough (SOB, wheezing). , Normal, Disp-1 Each, R-0      ibuprofen (MOTRIN) 800 mg tablet Take 1 Tablet by mouth every six (6) hours as needed for Pain for up to 7 days. , Normal, Disp-20 Tablet, R-0      benzonatate (Tessalon Perles) 100 mg capsule Take 1 Capsule by mouth three (3) times daily as needed for Cough for up to 7 days. , Normal, Disp-21 Capsule, R-0      azithromycin (Zithromax TRI-KENZIE) 500 mg tab Take by mouth as directed, Normal, Disp-3 Tablet, R-0         CONTINUE these medications which have NOT CHANGED    Details   SUMAtriptan (IMITREX) 50 mg tablet Take 1 Tab by mouth once as needed for Migraine for up to 1 dose. You may take one more dose 2 hours later if your migraine is still present. Do not take more than 2 tabs in 24 hours. , Normal, Disp-20 Tab, R-0      ergocalciferol (ERGOCALCIFEROL) 1,250 mcg (50,000 unit) capsule Take 1 Cap by mouth every seven (7) days. , Normal, Disp-8 Cap, R-0      ferrous sulfate (IRON) 325 mg (65 mg iron) EC tablet Take 1 Tab by mouth daily. , Normal, Disp-90 Tab, R-0      LORATADINE (CLARITIN PO) Take  by mouth., Historical Med      ONDANSETRON HCL (ZOFRAN PO) Take  by mouth., Historical Med      doxylamine-pyridoxine (DICLEGIS) 10-10 mg TbEC Take 2 Tabs by mouth nightly., Normal, Disp-100 Tab, R-1      promethazine (PHENERGAN) 25 mg tablet Take 1 Tab by mouth every six (6) hours as needed for Nausea., Normal, Disp-30 Tab, R-0      ALYACEN 1/35, 28, 1-35 mg-mcg tab Take 1 Tab by mouth daily. , Normal, Disp-90 Tab, R-4, KELLI      diphenhydrAMINE (BENADRYL) 25 mg capsule Take 50 mg by mouth every six (6) hours as needed., Historical Med           2.    Follow-up Information     Follow up With Specialties Details Why Contact Desire Scott, DO Family Medicine Schedule an appointment as soon as possible for a visit   [address] [place] [address]  [phone]      [facility] EMERGENCY DEP Emergency Medicine Go to  As needed, If symptoms worsen Cristhian  [phone]        3.  Return to ED if worse

## 2022-04-14 NOTE — DISCHARGE INSTRUCTIONS
It does not appear that the cause of your chest pain is cardiac, it is most likely bronchitis and upper respiratory infection. You do not have pneumonia on your chest x-ray. Medications have been prescribed to help manage her symptoms at home. We recommend that you follow-up with your primary care provider soon as possible for ongoing management of your symptoms. Please return to the emergency department for any worsening or worrisome symptoms.

## 2022-04-22 ENCOUNTER — VIRTUAL VISIT (OUTPATIENT)
Dept: FAMILY MEDICINE CLINIC | Age: 32
End: 2022-04-22
Payer: COMMERCIAL

## 2022-04-22 DIAGNOSIS — F32.1 CURRENT MODERATE EPISODE OF MAJOR DEPRESSIVE DISORDER WITHOUT PRIOR EPISODE (HCC): Primary | ICD-10-CM

## 2022-04-22 DIAGNOSIS — F41.1 GAD (GENERALIZED ANXIETY DISORDER): ICD-10-CM

## 2022-04-22 PROCEDURE — 99213 OFFICE O/P EST LOW 20 MIN: CPT | Performed by: STUDENT IN AN ORGANIZED HEALTH CARE EDUCATION/TRAINING PROGRAM

## 2022-04-22 RX ORDER — ESCITALOPRAM OXALATE 10 MG/1
10 TABLET ORAL DAILY
Qty: 30 TABLET | Refills: 1 | Status: SHIPPED | OUTPATIENT
Start: 2022-04-22 | End: 2022-06-20 | Stop reason: SDUPTHER

## 2022-04-22 RX ORDER — HYDROXYZINE 25 MG/1
25 TABLET, FILM COATED ORAL
Qty: 30 TABLET | Refills: 0 | Status: SHIPPED | OUTPATIENT
Start: 2022-04-22 | End: 2022-04-22

## 2022-04-22 RX ORDER — HYDROXYZINE 25 MG/1
25 TABLET, FILM COATED ORAL
Qty: 30 TABLET | Refills: 1 | Status: SHIPPED | OUTPATIENT
Start: 2022-04-22 | End: 2022-05-02

## 2022-04-22 RX ORDER — ESCITALOPRAM OXALATE 10 MG/1
10 TABLET ORAL DAILY
Qty: 30 TABLET | Refills: 1 | Status: SHIPPED | OUTPATIENT
Start: 2022-04-22 | End: 2022-04-22

## 2022-04-22 NOTE — PROGRESS NOTES
Britt Boles  28 y.o. female  1990  [address]  [address]:    Telemedicine Progress Note  Evelyn Mcdaniel DO       Encounter Date and Time: April 22, 2022 at 12:29 PM    Consent: Britt Boles, who was seen by synchronous (real-time) audio-video technology, and/or her healthcare decision maker, is aware that this patient-initiated, Telehealth encounter on 4/22/2022 is a billable service, with coverage as determined by her insurance carrier. She is aware that she may receive a bill and has provided verbal consent to proceed: Yes. Chief Complaint   Patient presents with    Anxiety    Depression     History of Present Illness   Britt Boles was evaluated by synchronous (real-time) audio-video technology from home, through a secure patient portal.    Britt Boles is a 28 y.o. female presents today with a chief complaint of anxiety. Occurring on a daily basis. Not sleeping well. Stressors include cost of living, job, and being a mom. Works for an insurance company (currently on leave). Received a promotion and thenn it was retracted which has really frustrated. Has had one panic attack recently related to work. Sleeping ok but has troubles falling asleep. Eating fairly poorly. Sees a therapist monthly and is open to seeing her more often. Never been on medicine for anxiety/depression. No SI/HI.  is a person of support, other family members do not \"believe in mental health\". DUDLEY 7 - 14 (moderate)  PHQ-9 - 16 (moderately severe)    Review of Systems   Review of Systems   Psychiatric/Behavioral: Positive for depression. Negative for suicidal ideas. The patient is nervous/anxious and has insomnia (troubles falling asleep).         Vitals/Objective:     General: alert, cooperative, no distress   Mental  status: mental status: alert, oriented to person, place, and time, normal mood, behavior, speech, dress, motor activity, and thought processes   Resp: resp: normal effort and no respiratory distress   Neuro: neuro: no gross deficits   Skin: skin: no discoloration or lesions of concern on visible areas   Due to this being a TeleHealth evaluation, many elements of the physical examination are unable to be assessed. Assessment and Plan:   Time-based coding, delete if not needed: I spent at least 15 minutes with this established patient, and >50% of the time was spent counseling and/or coordinating care regarding depression/anxeity    Diagnoses and all orders for this visit:    1. Current moderate episode of major depressive disorder without prior episode [facility]): DUDLEY 7 - 14 (moderate), PHQ-9 - 16 (moderately severe). No SI/HI  -     escitalopram oxalate (LEXAPRO) 10 mg tablet; Take 1 Tablet by mouth daily.  - Encouraged to increase counseling frequency  - Advised to go to ED with any thoughts of SI/HI    2. DUDLEY (generalized anxiety disorder):   -     hydrOXYzine HCL (ATARAX) 25 mg tablet; Take 1 Tablet by mouth three (3) times daily as needed for Anxiety for up to 10 days. Follow up in 4 weeks - already has an appointment on May 16     We discussed the expected course, resolution and complications of the diagnosis(es) in detail. Medication risks, benefits, costs, interactions, and alternatives were discussed as indicated. I advised her to contact the office if her condition worsens, changes or fails to improve as anticipated. She expressed understanding with the diagnosis(es) and plan. Patient understands that this encounter was a temporary measure, and the importance of further follow up and examination was emphasized. Patient verbalized understanding. Electronically Signed: Federico Jasmine DO    CPT Codes 28475-15086 for Established Patients may apply to this Telehealth Visit. POS code: 18. Modifier GT    Clarke Horta is a 28 y.o. female who was evaluated by an audio-video encounter for concerns as above. Patient identification was verified prior to start of the visit. A caregiver was present when appropriate. Due to this being a TeleHealth encounter ([facility] emergency), evaluation of the following organ systems was limited: Vitals/Constitutional/EENT/Resp/CV/GI//MS/Neuro/Skin/Heme-Lymph-Imm. Pursuant to the emergency declaration under the [address] [place] [address] waiver authority and the [employer] Act, this Virtual Visit was conducted, with patient's (and/or legal guardian's) consent, to reduce the patient's risk of exposure to COVID-19 and provide necessary medical care. Services were provided through a synchronous discussion virtually to substitute for in-person clinic visit. I was at home. The patient was at home. History   Patients past medical, surgical and family histories were reviewed and updated. Past Medical History:   Diagnosis Date    Abnormal Pap smear of cervix 09/01/2015    ASCUS, +HPV - repap 1 year 2016    Anemia NEC     Encounter for insertion of mirena IUD 1/19/12    3 years ago    Encounter for IUD removal 08/04/2015    HX OTHER MEDICAL     weidom teeth removed    Hypotension     Pap smear for cervical cancer screening 10/04/2016    negative -  repap 1 yr 2017     Past Surgical History:   Procedure Laterality Date    HX HEENT       Family History   Problem Relation Age of Onset    Hypertension Mother     Hypertension Maternal Grandmother     Hypertension Maternal Grandfather     Diabetes Maternal Grandfather      Social History     Tobacco Use    Smoking status: Never Smoker    Smokeless tobacco: Never Used   Vaping Use    Vaping Use: Never used   Substance Use Topics    Alcohol use: No    Drug use: No     Patient Active Problem List   Diagnosis Code    Supervision of other normal pregnancy, antepartum Z34.80    Obesity (BMI 30.0-34. 9) E66.9    Anxiety F41.9  Migraine without aura and without status migrainosus, not intractable G43.009          Current Medications/Allergies   Medications and Allergies reviewed:    Current Outpatient Medications   Medication Sig Dispense Refill    escitalopram oxalate (LEXAPRO) 10 mg tablet Take 1 Tablet by mouth daily. 30 Tablet 1    hydrOXYzine HCL (ATARAX) 25 mg tablet Take 1 Tablet by mouth three (3) times daily as needed for Anxiety for up to 10 days. 30 Tablet 1    methylPREDNISolone (Medrol, Kenzie,) 4 mg tablet Take by mouth as directed 1 Dose Pack 0    albuterol sulfate (PROAIR RESPICLICK) 90 mcg/actuation breath activated inhaler Take 2 Puffs by inhalation four (4) times daily as needed for Cough (SOB, wheezing). 1 Each 0    azithromycin (Zithromax TRI-KENZIE) 500 mg tab Take by mouth as directed 3 Tablet 0    SUMAtriptan (IMITREX) 50 mg tablet Take 1 Tab by mouth once as needed for Migraine for up to 1 dose. You may take one more dose 2 hours later if your migraine is still present. Do not take more than 2 tabs in 24 hours. 20 Tab 0    ergocalciferol (ERGOCALCIFEROL) 1,250 mcg (50,000 unit) capsule Take 1 Cap by mouth every seven (7) days. (Patient not taking: Reported on 6/11/2021) 8 Cap 0    ferrous sulfate (IRON) 325 mg (65 mg iron) EC tablet Take 1 Tab by mouth daily. 90 Tab 0    LORATADINE (CLARITIN PO) Take  by mouth. (Patient not taking: Reported on 6/11/2021)      ONDANSETRON HCL (ZOFRAN PO) Take  by mouth. (Patient not taking: Reported on 6/11/2021)      doxylamine-pyridoxine (DICLEGIS) 10-10 mg TbEC Take 2 Tabs by mouth nightly. (Patient not taking: Reported on 6/11/2021) 100 Tab 1    promethazine (PHENERGAN) 25 mg tablet Take 1 Tab by mouth every six (6) hours as needed for Nausea. (Patient not taking: Reported on 6/11/2021) 30 Tab 0    ALYACEN 1/35, 28, 1-35 mg-mcg tab Take 1 Tab by mouth daily. 90 Tab 4    diphenhydrAMINE (BENADRYL) 25 mg capsule Take 50 mg by mouth every six (6) hours as needed. No Known Allergies

## 2022-04-27 NOTE — PROGRESS NOTES
[address] Medicine Residency Attending Addendum:  The resident physician, the patient and I were not physically present during this encounter. The resident and I are concurrently monitoring the patient care through appropriate telecommunication technology. I discussed the findings, assessment and plan with the resident and agree with the resident's findings and plan as documented in the resident's note.

## 2022-05-16 ENCOUNTER — OFFICE VISIT (OUTPATIENT)
Dept: FAMILY MEDICINE CLINIC | Age: 32
End: 2022-05-16
Payer: COMMERCIAL

## 2022-05-16 VITALS
RESPIRATION RATE: 18 BRPM | OXYGEN SATURATION: 96 % | HEART RATE: 68 BPM | HEIGHT: 64 IN | WEIGHT: 216 LBS | SYSTOLIC BLOOD PRESSURE: 98 MMHG | BODY MASS INDEX: 36.88 KG/M2 | DIASTOLIC BLOOD PRESSURE: 63 MMHG

## 2022-05-16 DIAGNOSIS — E66.09 CLASS 2 OBESITY DUE TO EXCESS CALORIES WITHOUT SERIOUS COMORBIDITY WITH BODY MASS INDEX (BMI) OF 37.0 TO 37.9 IN ADULT: ICD-10-CM

## 2022-05-16 DIAGNOSIS — F32.1 CURRENT MODERATE EPISODE OF MAJOR DEPRESSIVE DISORDER WITHOUT PRIOR EPISODE (HCC): Primary | ICD-10-CM

## 2022-05-16 DIAGNOSIS — F41.1 GAD (GENERALIZED ANXIETY DISORDER): ICD-10-CM

## 2022-05-16 DIAGNOSIS — G43.009 MIGRAINE WITHOUT AURA AND WITHOUT STATUS MIGRAINOSUS, NOT INTRACTABLE: ICD-10-CM

## 2022-05-16 DIAGNOSIS — Z13.31 POSITIVE DEPRESSION SCREENING: ICD-10-CM

## 2022-05-16 PROCEDURE — 99214 OFFICE O/P EST MOD 30 MIN: CPT | Performed by: FAMILY MEDICINE

## 2022-05-16 RX ORDER — GLUCOSAMINE SULFATE 1500 MG
1000 POWDER IN PACKET (EA) ORAL DAILY
COMMUNITY
End: 2022-05-16

## 2022-05-16 RX ORDER — SUMATRIPTAN 50 MG/1
TABLET, FILM COATED ORAL
Qty: 10 TABLET | Refills: 0 | Status: SHIPPED | OUTPATIENT
Start: 2022-05-16

## 2022-05-16 NOTE — PROGRESS NOTES
Rafiq Shoemaker is a 28 y.o. female    Chief Complaint   Patient presents with    Depression    Anxiety       1. Have you been to the ER, urgent care clinic since your last visit? Hospitalized since your last visit? No  2. Have you seen or consulted any other health care providers outside of the [address] [place] [address] since your last visit? Include any pap smears or colon screening.  No    Visit Vitals  BP 98/63 (BP 1 Location: Right arm, BP Patient Position: Sitting)   Pulse 68   Resp 18   Ht 5' 4\" (1.626 m)   Wt 216 lb (98 kg)   SpO2 96%   BMI 37.08 kg/m²     3 most recent PHQ Screens 5/16/2022   Little interest or pleasure in doing things More than half the days   Feeling down, depressed, irritable, or hopeless More than half the days   Total Score PHQ 2 4   Trouble falling or staying asleep, or sleeping too much Nearly every day   Feeling tired or having little energy More than half the days   Poor appetite, weight loss, or overeating More than half the days   Feeling bad about yourself - or that you are a failure or have let yourself or your family down Nearly every day   Trouble concentrating on things such as school, work, reading, or watching TV Nearly every day   Moving or speaking so slowly that other people could have noticed; or the opposite being so fidgety that others notice Not at all   Thoughts of being better off dead, or hurting yourself in some way Not at all   PHQ 9 Score 17   How difficult have these problems made it for you to do your work, take care of your home and get along with others Somewhat difficult     DUDLEY 2/7 5/16/2022   Feeling nervous, anxious, or on edge 3   Not being able to stop or control worrying 3   Worrying too much about different things 3   Trouble relaxing 2   Being so restless that it is hard to sit still 0   Becoming easily annoyed or irritable 1   Feeling afraid as if something awful might happen 2   DUDLEY-7 Total Score 14         Health Maintenance Due   Topic Date Due    Hepatitis C Screening  Never done    Cervical cancer screen  12/06/2021     Patient was prescribed Lexapro daily and hydroxyzine prn at last visit (4/22/22) but has not started either due to have concerns about the medications. Has never taken antidepressants before and did have more questions to ask before she felt comfortable taking them. Is establishing with GYN in June for pap.

## 2022-05-16 NOTE — PROGRESS NOTES
History of Present Illness:     Chief Complaint   Patient presents with    Depression   Brenden Chavez is a 28 y.o. female     Anxiety and depression  Recently prescribed Lexapro by Dr. Alex Infante  Has not taken it due to questions she had  Also prescribed Hydroxyzine, but not taking  Currently in counseling    No prior hx of anxiety or depression  First time on medications  Family is not supportive of medications  Her biggest concerns are addiction and weight gain    Wanted to try the natural route for her anxiety  Taking Ashwaganda and [address] [facility] [address]    Obesity  Trying to lose weight  Doing an exercise challange      3 most recent PHQ Screens 5/16/2022   Little interest or pleasure in doing things More than half the days   Feeling down, depressed, irritable, or hopeless More than half the days   Total Score PHQ 2 4   Trouble falling or staying asleep, or sleeping too much Nearly every day   Feeling tired or having little energy More than half the days   Poor appetite, weight loss, or overeating More than half the days   Feeling bad about yourself - or that you are a failure or have let yourself or your family down Nearly every day   Trouble concentrating on things such as school, work, reading, or watching TV Nearly every day   Moving or speaking so slowly that other people could have noticed; or the opposite being so fidgety that others notice Not at all   Thoughts of being better off dead, or hurting yourself in some way Not at all   PHQ 9 Score 17   How difficult have these problems made it for you to do your work, take care of your home and get along with others Somewhat difficult     DUDLEY 2/7 5/16/2022   Feeling nervous, anxious, or on edge 3   Not being able to stop or control worrying 3   Worrying too much about different things 3   Trouble relaxing 2   Being so restless that it is hard to sit still 0   Becoming easily annoyed or irritable 1   Feeling afraid as if something awful might happen 2   DUDLEY-7 Total Score 14         PMH (REVIEWED):  Past Medical History:   Diagnosis Date    Abnormal Pap smear of cervix 09/01/2015    ASCUS, +HPV - repap 1 year 2016    Anemia NEC     Encounter for insertion of mirena IUD 1/19/12    3 years ago    Encounter for IUD removal 08/04/2015    HX OTHER MEDICAL     weidom teeth removed    Hypotension     Pap smear for cervical cancer screening 10/04/2016    negative -  repap 1 yr 2017       Current Medications/Allergies (REVIEWED):     Current Outpatient Medications on File Prior to Visit   Medication Sig Dispense Refill    ergocalciferol (ERGOCALCIFEROL) 1,250 mcg (50,000 unit) capsule Take 1 Cap by mouth every seven (7) days. (Patient taking differently: Take 50,000 Units by mouth daily.) 8 Cap 0    diphenhydrAMINE (BENADRYL) 25 mg capsule Take 50 mg by mouth every six (6) hours as needed.  escitalopram oxalate (LEXAPRO) 10 mg tablet Take 1 Tablet by mouth daily. (Patient not taking: Reported on 5/16/2022) 30 Tablet 1     No current facility-administered medications on file prior to visit. No Known Allergies      Review of Systems:     Review of Systems   Psychiatric/Behavioral: Positive for depression. Negative for suicidal ideas. The patient is nervous/anxious. Objective:     Vitals:    05/16/22 1332   BP: 98/63   Pulse: 68   Resp: 18   SpO2: 96%   Weight: 216 lb (98 kg)   Height: 5' 4\" (1.626 m)       Physical Exam:  General appearance - alert, well appearing, and in no distress and overweight  Mental status - alert, oriented to person, place, and time, depressed mood, affect appropriate to mood  Chest - no tachypnea, retractions or cyanosis  Neurological - alert, oriented, normal speech, no focal findings or movement disorder noted    Recent Labs:  No results found for this or any previous visit (from the past 12 hour(s)). Assessment and Plan:       ICD-10-CM ICD-9-CM    1.  Current moderate episode of major depressive disorder without prior episode [facility]  F32.1 296.22    2. DUDLEY (generalized anxiety disorder)  F41.1 300.02    3. Positive depression screening  Z13.31 796.4    4. Migraine without aura and without status migrainosus, not intractable  G43.009 346.10 SUMAtriptan (IMITREX) 50 mg tablet   5. Class 2 obesity due to excess calories without serious comorbidity with body mass index (BMI) of 37.0 to 37.9 in adult  E66.09 278.00     Z68.37 V85.37        MDD and DUDLEY  PHQ: 17  GAD7:14    Addressed concerns about medications  She will start Lexapro as prescribed by Dr. Frankey Presser counseling    Refilled Imitrex for migraines    Obesity  We discussed her diet and exercise regimen briefly  Goals set:  - Eat more veges, less starchy sides  - Continue exercise challenge  - Eat dinner as a family    Will address weight in more detail at next visit    Follow up: 4 wks. Check in via [facility] in 2 wks    Idris Bennett MD    We discussed the expected course, resolution and complications of the diagnosis(es) in detail. Medication risks, benefits, costs, interactions, and alternatives were discussed as indicated. I advised her to contact the office if her condition worsens, changes or fails to improve as anticipated. She expressed understanding with the diagnosis(es) and plan.

## 2022-05-16 NOTE — PATIENT INSTRUCTIONS
Learning About Low-Carbohydrate Diets  What is a low-carbohydrate diet? A low-carbohydrate (or \"low-carb\") diet limits foods and drinks that have carbohydrates. This includes grains, fruits, milk and yogurt, and starchy vegetables like potatoes, beans, and corn. It also avoids foods and drinks that have added sugar. Instead, low-carb diets include foods that are high in protein and fat. Why might you follow a low-carb diet? Low-carb diets may be used for a variety of reasons, such as for weight loss. People who have diabetes may use a low-carb diet to help manage their blood sugar levels. What should you do before you start the diet? Talk to your doctor before you try any diet. This is even more important if you have health problems like kidney disease, heart disease, or diabetes. Your doctor may suggest that you meet with a registered dietitian. A dietitian can help you make an eating plan that works for you. What foods do you eat on a low-carb diet? On a low-carb diet, you choose foods that are high in protein and fat. Examples of these are:  · Meat, poultry, and fish. · Eggs. · Nuts, such as walnuts, pecans, almonds, and peanuts. · Peanut butter and other nut butters. · Tofu. · Avocado. · Lysbeth Gonsalves. · Non-starchy vegetables like broccoli, cauliflower, green beans, mushrooms, peppers, lettuce, and spinach. · Unsweetened non-dairy milks like almond milk and coconut milk. · Cheese, cottage cheese, and cream cheese. Where can you learn more? Go to http://www.gray.com/  Enter C335 in the search box to learn more about \"Learning About Low-Carbohydrate Diets. \"  Current as of: September 8, 2021               Content Version: 13.2  © 6130-5827 Healthwise, Incorporated. Care instructions adapted under license by [employer] (which disclaims liability or warranty for this information).  If you have questions about a medical condition or this instruction, always ask your healthcare professional. [address] any warranty or liability for your use of this information.

## 2022-06-20 ENCOUNTER — OFFICE VISIT (OUTPATIENT)
Dept: FAMILY MEDICINE CLINIC | Age: 32
End: 2022-06-20
Payer: COMMERCIAL

## 2022-06-20 VITALS
HEART RATE: 75 BPM | SYSTOLIC BLOOD PRESSURE: 94 MMHG | HEIGHT: 64 IN | RESPIRATION RATE: 16 BRPM | DIASTOLIC BLOOD PRESSURE: 58 MMHG | WEIGHT: 217 LBS | BODY MASS INDEX: 37.05 KG/M2 | OXYGEN SATURATION: 98 %

## 2022-06-20 DIAGNOSIS — F32.1 CURRENT MODERATE EPISODE OF MAJOR DEPRESSIVE DISORDER WITHOUT PRIOR EPISODE (HCC): Primary | ICD-10-CM

## 2022-06-20 DIAGNOSIS — G43.009 MIGRAINE WITHOUT AURA AND WITHOUT STATUS MIGRAINOSUS, NOT INTRACTABLE: ICD-10-CM

## 2022-06-20 DIAGNOSIS — G89.29 CHRONIC MIDLINE LOW BACK PAIN WITHOUT SCIATICA: ICD-10-CM

## 2022-06-20 DIAGNOSIS — F41.1 GAD (GENERALIZED ANXIETY DISORDER): ICD-10-CM

## 2022-06-20 DIAGNOSIS — E66.09 CLASS 2 OBESITY DUE TO EXCESS CALORIES WITHOUT SERIOUS COMORBIDITY WITH BODY MASS INDEX (BMI) OF 37.0 TO 37.9 IN ADULT: ICD-10-CM

## 2022-06-20 DIAGNOSIS — M54.50 CHRONIC MIDLINE LOW BACK PAIN WITHOUT SCIATICA: ICD-10-CM

## 2022-06-20 LAB
HCG URINE, QL. (POC): NEGATIVE
VALID INTERNAL CONTROL?: YES

## 2022-06-20 PROCEDURE — 81025 URINE PREGNANCY TEST: CPT | Performed by: FAMILY MEDICINE

## 2022-06-20 PROCEDURE — 99214 OFFICE O/P EST MOD 30 MIN: CPT | Performed by: FAMILY MEDICINE

## 2022-06-20 RX ORDER — PHENTERMINE HYDROCHLORIDE 15 MG/1
15 CAPSULE ORAL
Qty: 30 CAPSULE | Refills: 0 | Status: SHIPPED | OUTPATIENT
Start: 2022-06-20 | End: 2022-07-20 | Stop reason: SDUPTHER

## 2022-06-20 RX ORDER — IBUPROFEN 800 MG/1
800 TABLET ORAL
Qty: 30 TABLET | Refills: 1 | Status: SHIPPED | OUTPATIENT
Start: 2022-06-20

## 2022-06-20 RX ORDER — ESCITALOPRAM OXALATE 10 MG/1
10 TABLET ORAL DAILY
Qty: 90 TABLET | Refills: 1 | Status: SHIPPED | OUTPATIENT
Start: 2022-06-20 | End: 2022-07-05

## 2022-06-20 NOTE — PROGRESS NOTES
History of Present Illness:     Chief Complaint   Patient presents with    Depression    Migraine       Wai Soliz is a 28 y.o. female     Depression  Feels she is doing a lot better  Her house is clean, able to work more  Tolerating the Lexapro without any issues  Noticed she clinches her teeth    Migraine  Having one today  Described as piercing  Associated with nausea and light sensitivity  Needs dark, quiet room to help  Imitrex helps, sometimes a BC HA powder to help  Occurring about once per week  Using Imitrex at most 3 times per week  Feels her migraines are more often  Thinks it may be due to a lot of stressors  Does report hx of head trauma    Obesity  Heaviest weight: 217 lbs (current weight)    Diets tried:  - Keto diet  - Vegetarianism  - Portion control  - Never tried any medications    Exercise:   - No formal exercise regimen  - Walking sometimes    Barriers to wt loss:  - Difficulty being consistent with exercise regimen    PMH (REVIEWED):  Past Medical History:   Diagnosis Date    Abnormal Pap smear of cervix 09/01/2015    ASCUS, +HPV - repap 1 year 2016    Anemia NEC     Encounter for insertion of mirena IUD 1/19/12    3 years ago    Encounter for IUD removal 08/04/2015    HX OTHER MEDICAL     weidom teeth removed    Hypotension     Pap smear for cervical cancer screening 10/04/2016    negative -  repap 1 yr 2017       Current Medications/Allergies (REVIEWED):     Current Outpatient Medications on File Prior to Visit   Medication Sig Dispense Refill    SUMAtriptan (IMITREX) 50 mg tablet Take 1 Tab by mouth once as needed for Migraine for up to 1 dose. You may take one more dose 2 hours later if your migraine is still present. Do not take more than 2 tabs in 24 hours. 10 Tablet 0    ergocalciferol (ERGOCALCIFEROL) 1,250 mcg (50,000 unit) capsule Take 1 Cap by mouth every seven (7) days.  (Patient taking differently: Take 50,000 Units by mouth daily.) 8 Cap 0    diphenhydrAMINE (BENADRYL) 25 mg capsule Take 50 mg by mouth every six (6) hours as needed. No current facility-administered medications on file prior to visit. No Known Allergies      Review of Systems:     Review of Systems   Constitutional: Negative for chills and fever. Respiratory: Negative for cough and shortness of breath. Cardiovascular: Negative for chest pain, palpitations and leg swelling. Musculoskeletal: Positive for back pain. Neurological: Positive for headaches. Psychiatric/Behavioral: Negative for depression, substance abuse and suicidal ideas. The patient is not nervous/anxious and does not have insomnia. Objective:     Vitals:    06/20/22 1453   BP: (!) 94/58   Pulse: 75   Resp: 16   SpO2: 98%   Weight: 217 lb (98.4 kg)   Height: 5' 4\" (1.626 m)       Physical Exam:  General appearance - alert, well appearing, and in no distress and overweight  Mental status - alert, oriented to person, place, and time, normal mood, behavior, speech, dress, motor activity, and thought processes  Chest - clear to auscultation, no wheezes, rales or rhonchi, symmetric air entry  Heart - normal rate, regular rhythm, normal S1, S2, no murmurs, rubs, clicks or gallops  Neurological - alert, oriented, normal speech, no focal findings or movement disorder noted    Recent Labs:  Recent Results (from the past 12 hour(s))   AMB POC URINE PREGNANCY TEST, VISUAL COLOR COMPARISON    Collection Time: 06/20/22  3:56 PM   Result Value Ref Range    VALID INTERNAL CONTROL POC Yes     HCG urine, Ql. (POC) Negative Negative       Assessment and Plan:       ICD-10-CM ICD-9-CM    1. Current moderate episode of major depressive disorder without prior episode (HCC)  F32.1 296.22 escitalopram oxalate (LEXAPRO) 10 mg tablet   2. DUDLEY (generalized anxiety disorder)  F41.1 300.02    3. Migraine without aura and without status migrainosus, not intractable  G43.009 346.10 ibuprofen (MOTRIN) 800 mg tablet   4.  Class 2 obesity due to excess calories without serious comorbidity with body mass index (BMI) of 37.0 to 37.9 in adult  E66.09 278.00 phentermine (ADIPEX_P) 15 mg capsule    Z68.37 V85.37 AMB POC URINE PREGNANCY TEST, VISUAL COLOR COMPARISON      DRUG SCREEN, URINE   5. Chronic midline low back pain without sciatica  M54.50 724.2     G89.29 338.29        MDD/ DUDLEY, improving  Continue Lexapro 10mg daily; refilled    Migraine, fairly controlled  Counseled on using NSAIDs early for HAs  Reserve Imitrex for severe migraine  Consider preventative med for migraines, specifically Topamax to aid with wt loss    Obesity  Co-morbidities: chronic low back pain, depression  Starting/ Heaviest weight: 217 lbs (6/20/22)  Discussed options for wt loss  Counseled extensively on diet and exercise    Wt loss plan:  - Trial Phentermine 15mg daily given anxiety well controlled  - Incorporate exercise  - Continue balanced diet    PDMP reviewed; appropriate  EKG reviewed from 4/13/22 - WNL, no arrhythmia or ischemic changes  UDS on 6/20/2022   UPT neg; s/p tubal ligation for birth control    Follow up: 1 mo    Kaity Kimbrough MD    We discussed the expected course, resolution and complications of the diagnosis(es) in detail. Medication risks, benefits, costs, interactions, and alternatives were discussed as indicated. I advised her to contact the office if her condition worsens, changes or fails to improve as anticipated. She expressed understanding with the diagnosis(es) and plan.

## 2022-06-20 NOTE — PROGRESS NOTES
Amber Umana is a 28 y.o. female    Chief Complaint   Patient presents with    Depression    Migraine       1. Have you been to the ER, urgent care clinic since your last visit? Hospitalized since your last visit? No  2. Have you seen or consulted any other health care providers outside of the [address] [place] [address] since your last visit? Include any pap smears or colon screening.  No    Visit Vitals  BP (!) 94/58 (BP 1 Location: Right arm, BP Patient Position: Sitting)   Pulse 75   Resp 16   Ht 5' 4\" (1.626 m)   Wt 217 lb (98.4 kg)   SpO2 98%   BMI 37.25 kg/m²     3 most recent PHQ Screens 6/20/2022   Little interest or pleasure in doing things Not at all   Feeling down, depressed, irritable, or hopeless Not at all   Total Score PHQ 2 0   Trouble falling or staying asleep, or sleeping too much Not at all   Feeling tired or having little energy Not at all   Poor appetite, weight loss, or overeating Several days   Feeling bad about yourself - or that you are a failure or have let yourself or your family down Not at all   Trouble concentrating on things such as school, work, reading, or watching TV Not at all   Moving or speaking so slowly that other people could have noticed; or the opposite being so fidgety that others notice Not at all   Thoughts of being better off dead, or hurting yourself in some way Not at all   PHQ 9 Score 1   How difficult have these problems made it for you to do your work, take care of your home and get along with others -     Health Maintenance Due   Topic Date Due    Hepatitis C Screening  Never done    Cervical cancer screen  12/06/2021    COVID-19 Vaccine (3 - Booster for Pfizer series) 06/06/2022     Taking Imitrex a few times a week, not needed every day

## 2022-07-05 DIAGNOSIS — F32.1 CURRENT MODERATE EPISODE OF MAJOR DEPRESSIVE DISORDER WITHOUT PRIOR EPISODE (HCC): ICD-10-CM

## 2022-07-05 RX ORDER — ESCITALOPRAM OXALATE 10 MG/1
TABLET ORAL
Qty: 30 TABLET | Refills: 0 | Status: SHIPPED | OUTPATIENT
Start: 2022-07-05

## 2022-07-20 ENCOUNTER — OFFICE VISIT (OUTPATIENT)
Dept: FAMILY MEDICINE CLINIC | Age: 32
End: 2022-07-20
Payer: COMMERCIAL

## 2022-07-20 VITALS
HEART RATE: 73 BPM | TEMPERATURE: 98.2 F | OXYGEN SATURATION: 99 % | SYSTOLIC BLOOD PRESSURE: 109 MMHG | BODY MASS INDEX: 35.71 KG/M2 | HEIGHT: 64 IN | DIASTOLIC BLOOD PRESSURE: 69 MMHG | RESPIRATION RATE: 18 BRPM | WEIGHT: 209.2 LBS

## 2022-07-20 DIAGNOSIS — G89.29 CHRONIC MIDLINE LOW BACK PAIN WITHOUT SCIATICA: ICD-10-CM

## 2022-07-20 DIAGNOSIS — E66.01 CLASS 2 SEVERE OBESITY DUE TO EXCESS CALORIES WITH SERIOUS COMORBIDITY AND BODY MASS INDEX (BMI) OF 35.0 TO 35.9 IN ADULT (HCC): Primary | ICD-10-CM

## 2022-07-20 DIAGNOSIS — E66.09 CLASS 2 OBESITY DUE TO EXCESS CALORIES WITHOUT SERIOUS COMORBIDITY WITH BODY MASS INDEX (BMI) OF 37.0 TO 37.9 IN ADULT: ICD-10-CM

## 2022-07-20 DIAGNOSIS — G43.009 MIGRAINE WITHOUT AURA AND WITHOUT STATUS MIGRAINOSUS, NOT INTRACTABLE: ICD-10-CM

## 2022-07-20 DIAGNOSIS — M54.50 CHRONIC MIDLINE LOW BACK PAIN WITHOUT SCIATICA: ICD-10-CM

## 2022-07-20 DIAGNOSIS — F32.1 CURRENT MODERATE EPISODE OF MAJOR DEPRESSIVE DISORDER WITHOUT PRIOR EPISODE (HCC): ICD-10-CM

## 2022-07-20 PROCEDURE — 99213 OFFICE O/P EST LOW 20 MIN: CPT | Performed by: FAMILY MEDICINE

## 2022-07-20 RX ORDER — PHENTERMINE HYDROCHLORIDE 15 MG/1
15 CAPSULE ORAL
Qty: 30 CAPSULE | Refills: 0 | Status: SHIPPED | OUTPATIENT
Start: 2022-07-20 | End: 2022-08-30 | Stop reason: SDUPTHER

## 2022-07-20 NOTE — PROGRESS NOTES
Identified pt with two pt identifiers(name and ). Reviewed record in preparation for visit and have obtained necessary documentation. Chief Complaint   Patient presents with    Follow-up     Weight loss and migraines    Medication Evaluation        Health Maintenance Due   Topic    Hepatitis C Screening     Cervical cancer screen     COVID-19 Vaccine (3 - Booster for Pfizer series)       Visit Vitals  /69 (BP 1 Location: Right upper arm, BP Patient Position: Sitting, BP Cuff Size: Adult)   Pulse 73   Temp 98.2 °F (36.8 °C) (Oral)   Resp 18   Ht 5' 4\" (1.626 m)   Wt 209 lb 3.2 oz (94.9 kg)   SpO2 99%   BMI 35.91 kg/m²         Coordination of Care Questionnaire:  :   1) Have you been to an emergency room, urgent care, or hospitalized since your last visit? If yes, where when, and reason for visit? no       2. Have seen or consulted any other health care provider since your last visit? If yes, where when, and reason for visit? NO        Patient is accompanied by self I have received verbal consent from Carrie Blancas to discuss any/all medical information while they are present in the room.

## 2022-07-20 NOTE — PROGRESS NOTES
History of Present Illness:     Chief Complaint   Patient presents with    Follow-up     Weight loss and migraines    Medication Evaluation       Vinayak Biggs is a 28 y.o. female     Obesity  Heaviest weight: 217 lbs   Current weight: 209 lbs  Started on Phentermine at last visit  Does have dry mouth but tolerable      Diets tried:  - Appetite is down  - Drinking lots of water  - Has to make herself eat but getting on a schedule    Exercise:  - No formal exercise regimen  - Walking sometimes  - Wants to start running but time is a challenge     Migraines  Taking medications as prescribed  - Trying Ibuprofen then Sumatriptan if worse  Currently occurring once per week    Depression  Followed with her therapist  Feels the Lexapro is helping    PMH (REVIEWED):  Past Medical History:   Diagnosis Date    Abnormal Pap smear of cervix 09/01/2015    ASCUS, +HPV - repap 1 year 2016    Anemia NEC     Encounter for insertion of mirena IUD 1/19/12    3 years ago    Encounter for IUD removal 08/04/2015    HX OTHER MEDICAL     weidom teeth removed    Hypotension     Pap smear for cervical cancer screening 10/04/2016    negative -  repap 1 yr 2017       Current Medications/Allergies (REVIEWED):     Current Outpatient Medications on File Prior to Visit   Medication Sig Dispense Refill    ferrous sulfate (IRON PO) Take  by mouth.      escitalopram oxalate (LEXAPRO) 10 mg tablet Take 1 tablet by mouth once daily 30 Tablet 0    phentermine (ADIPEX_P) 15 mg capsule Take 1 Capsule by mouth every morning. Max Daily Amount: 15 mg. 30 Capsule 0    ibuprofen (MOTRIN) 800 mg tablet Take 1 Tablet by mouth every eight (8) hours as needed for Pain. 30 Tablet 1    SUMAtriptan (IMITREX) 50 mg tablet Take 1 Tab by mouth once as needed for Migraine for up to 1 dose. You may take one more dose 2 hours later if your migraine is still present. Do not take more than 2 tabs in 24 hours.  10 Tablet 0    ergocalciferol (ERGOCALCIFEROL) 1,250 mcg (50,000 unit) capsule Take 1 Cap by mouth every seven (7) days. (Patient taking differently: Take 50,000 Units by mouth in the morning.) 8 Cap 0    diphenhydrAMINE (BENADRYL) 25 mg capsule Take 50 mg by mouth every six (6) hours as needed. No current facility-administered medications on file prior to visit. No Known Allergies      Review of Systems:     Denies fever, chills, sweats  Denies chest pain, BUCKNER, palpitations, LE edema  Denies cough, sputum production, SOB, pleuritic chest pain, wheezing  Denies anxiety or depression      Objective:     Vitals:    07/20/22 1302   BP: 109/69   Pulse: 73   Resp: 18   Temp: 98.2 °F (36.8 °C)   TempSrc: Oral   SpO2: 99%   Weight: 209 lb 3.2 oz (94.9 kg)   Height: 5' 4\" (1.626 m)       Physical Exam:  General appearance - alert, well appearing, and in no distress and overweight  Mental status - alert, oriented to person, place, and time, normal mood, behavior, speech, dress, motor activity, and thought processes  Chest - clear to auscultation, no wheezes, rales or rhonchi, symmetric air entry  Heart - normal rate, regular rhythm, normal S1, S2, no murmurs, rubs, clicks or gallops  Neurological - alert, oriented, normal speech, no focal findings or movement disorder noted    Recent Labs:  No results found for this or any previous visit (from the past 12 hour(s)). Assessment and Plan:       ICD-10-CM ICD-9-CM    1. Class 2 severe obesity due to excess calories with serious comorbidity and body mass index (BMI) of 35.0 to 35.9 in adult ([facility])  E66.01 278.01     Z68.35 V85.35       2. Current moderate episode of major depressive disorder without prior episode ([facility])  F32.1 296.22       3. Migraine without aura and without status migrainosus, not intractable  G43.009 346.10       4.  Chronic midline low back pain without sciatica  M54.50 724.2     G89.29 338.29           Obesity  Co-morbidities: chronic low back pain, depression  Starting/ Heaviest weight: 217 lbs (6/20/22)  Current weight: 209 lbs (-8 lbs, -3.7%)     Wt loss plan:  - Trial Phentermine 15mg daily given anxiety well controlled  - Incorporate exercise  - Continue balanced diet     PDMP reviewed; appropriate  EKG reviewed from 4/13/22 - WNL, no arrhythmia or ischemic changes  UDS on 6/20/2022 - Negative  S/p tubal ligation for birth control     Follow up: 1 mo    Kyle Lucero MD    We discussed the expected course, resolution and complications of the diagnosis(es) in detail. Medication risks, benefits, costs, interactions, and alternatives were discussed as indicated. I advised her to contact the office if her condition worsens, changes or fails to improve as anticipated. She expressed understanding with the diagnosis(es) and plan.

## 2022-08-11 ENCOUNTER — TELEPHONE (OUTPATIENT)
Dept: FAMILY MEDICINE CLINIC | Age: 32
End: 2022-08-11

## 2022-08-30 ENCOUNTER — OFFICE VISIT (OUTPATIENT)
Dept: FAMILY MEDICINE CLINIC | Age: 32
End: 2022-08-30
Payer: COMMERCIAL

## 2022-08-30 VITALS
RESPIRATION RATE: 16 BRPM | OXYGEN SATURATION: 97 % | HEIGHT: 64 IN | DIASTOLIC BLOOD PRESSURE: 62 MMHG | SYSTOLIC BLOOD PRESSURE: 97 MMHG | HEART RATE: 78 BPM | BODY MASS INDEX: 35.85 KG/M2 | WEIGHT: 210 LBS

## 2022-08-30 DIAGNOSIS — G43.009 MIGRAINE WITHOUT AURA AND WITHOUT STATUS MIGRAINOSUS, NOT INTRACTABLE: ICD-10-CM

## 2022-08-30 DIAGNOSIS — F32.1 CURRENT MODERATE EPISODE OF MAJOR DEPRESSIVE DISORDER WITHOUT PRIOR EPISODE (HCC): ICD-10-CM

## 2022-08-30 DIAGNOSIS — E66.09 CLASS 2 OBESITY DUE TO EXCESS CALORIES WITHOUT SERIOUS COMORBIDITY WITH BODY MASS INDEX (BMI) OF 37.0 TO 37.9 IN ADULT: Primary | ICD-10-CM

## 2022-08-30 PROCEDURE — 99213 OFFICE O/P EST LOW 20 MIN: CPT | Performed by: FAMILY MEDICINE

## 2022-08-30 RX ORDER — PHENTERMINE HYDROCHLORIDE 15 MG/1
15 CAPSULE ORAL
Qty: 30 CAPSULE | Refills: 0 | Status: SHIPPED | OUTPATIENT
Start: 2022-08-30

## 2022-08-30 NOTE — PROGRESS NOTES
History of Present Illness:     Chief Complaint   Patient presents with    Weight Loss       Nadeem Carlisle is a 28 y.o. female     Obesity  Heaviest weight: 217 lbs   Current weight: 210 lbs  Started on Phentermine recently  Does have dry mouth but tolerable      Diets:  - Appetite is down  - Drinking lots of water  - Has to make herself eat but getting on a schedule  - Eats a lot of processed foods with dinner bc of her kids (chicken nuggets, pizza, etc)    Exercise:  - No formal exercise regimen  - Walking sometimes  - Plan is to walk when kids are at activities     Migraines  Taking medications as prescribed  - Trying Ibuprofen then Sumatriptan if worse  Currently occurring once per week    Depression  Mood has been good  \"I am happy\"  Followed with her therapist  Feels the Lexapro is helping    PMH (REVIEWED):  Past Medical History:   Diagnosis Date    Abnormal Pap smear of cervix 09/01/2015    ASCUS, +HPV - repap 1 year 2016    Anemia NEC     Encounter for insertion of mirena IUD 1/19/12    3 years ago    Encounter for IUD removal 08/04/2015    HX OTHER MEDICAL     weidom teeth removed    Hypotension     Pap smear for cervical cancer screening 10/04/2016    negative -  repap 1 yr 2017       Current Medications/Allergies (REVIEWED):     Current Outpatient Medications on File Prior to Visit   Medication Sig Dispense Refill    ferrous sulfate (IRON PO) Take  by mouth.      phentermine (ADIPEX_P) 15 mg capsule Take 1 Capsule by mouth every morning. Max Daily Amount: 15 mg. 30 Capsule 0    escitalopram oxalate (LEXAPRO) 10 mg tablet Take 1 tablet by mouth once daily 30 Tablet 0    SUMAtriptan (IMITREX) 50 mg tablet Take 1 Tab by mouth once as needed for Migraine for up to 1 dose. You may take one more dose 2 hours later if your migraine is still present. Do not take more than 2 tabs in 24 hours.  10 Tablet 0    ergocalciferol (ERGOCALCIFEROL) 1,250 mcg (50,000 unit) capsule Take 1 Cap by mouth every seven (7) days. (Patient taking differently: Take 50,000 Units by mouth daily.) 8 Cap 0    ibuprofen (MOTRIN) 800 mg tablet Take 1 Tablet by mouth every eight (8) hours as needed for Pain. 30 Tablet 1    diphenhydrAMINE (BENADRYL) 25 mg capsule Take 50 mg by mouth every six (6) hours as needed. No current facility-administered medications on file prior to visit. No Known Allergies      Review of Systems:     Denies fever, chills, sweats  Denies chest pain, BUCKNER, palpitations, LE edema  Denies cough, sputum production, SOB, pleuritic chest pain, wheezing  Denies anxiety or depression      Objective:     Vitals:    08/30/22 1112   BP: 97/62   Pulse: 78   Resp: 16   SpO2: 97%   Weight: 210 lb (95.3 kg)   Height: 5' 4\" (1.626 m)       Physical Exam:  General appearance - alert, well appearing, and in no distress and overweight  Mental status - alert, oriented to person, place, and time, normal mood, behavior, speech, dress, motor activity, and thought processes  Chest - clear to auscultation, no wheezes, rales or rhonchi, symmetric air entry  Heart - normal rate, regular rhythm, normal S1, S2, no murmurs, rubs, clicks or gallops  Neurological - alert, oriented, normal speech, no focal findings or movement disorder noted    Recent Labs:  No results found for this or any previous visit (from the past 12 hour(s)).     Assessment and Plan:       ICD-10-CM ICD-9-CM    1. Class 2 obesity due to excess calories without serious comorbidity with body mass index (BMI) of 37.0 to 37.9 in adult  E66.09 278.00     Z68.37 V85.37           Obesity  Co-morbidities: chronic low back pain, depression  Starting/ Heaviest weight: 217 lbs (6/20/22)  Current weight: 210 lbs (gained 1 lbs since last visit, -7 lbs, -3.2%)  BMI: 36     Wt loss plan:  - Phentermine 15mg daily given anxiety well controlled  - Incorporate exercise  - Recommended trial of intermittent fasting, limiting largest meal to lunch time, small meal in evening     PDMP reviewed; appropriate  EKG reviewed from 4/13/22 - WNL, no arrhythmia or ischemic changes  UDS on 6/20/2022 - Negative  S/p tubal ligation for birth control     Follow up: 1 mo. Pending progress, will determine if we should increase Phentermine to 37.5mg. Worry it may worsen dry mouth. Karan Power MD    We discussed the expected course, resolution and complications of the diagnosis(es) in detail. Medication risks, benefits, costs, interactions, and alternatives were discussed as indicated. I advised her to contact the office if her condition worsens, changes or fails to improve as anticipated. She expressed understanding with the diagnosis(es) and plan.

## 2022-08-30 NOTE — PROGRESS NOTES
*ATTENTION:  This note has been created by a medical student for educational purposes only. Please do not refer to the content of this note for clinical decision-making, billing, or other purposes. Please see attending physicians note to obtain clinical information on this patient. *     CC: weight loss follow-up    Subjective   Darwin Graff is an 28 y.o. female with depression and migraines who presents for weight loss follow-up. Obesity:   Currently on phentermine 15 mg   Tolerating medication well  Some dry mouth, but tolerable with water  No abdominal pain, nausea, vomiting, constipation, numbness, or tingling    Diet:  Decreased appetite, sometimes forgets to eat  Breakfast at 10:30 AM, lunch at 1:30 PM, dinner 9 PM  Dinner sometimes consists of unhealthy food because kids are at home  Eating fruits and vegetables, drinking orange juice and water, no sodas    Exercise:  Walking for ~1 hour daily, thinking about getting a gym membership    Depression:   Mood \"good, I'm happy\" - went to concert recently   Currently on Lexapro 10 mg       Review of Systems   Review of Systems   Constitutional:  Positive for appetite change. Gastrointestinal:  Negative for abdominal pain, constipation, nausea and vomiting. Neurological:  Negative for numbness. HENT: Positive for dry mouth    Allergies   No Known Allergies    Medications  Current Outpatient Medications   Medication Sig    ferrous sulfate (IRON PO) Take  by mouth.    phentermine (ADIPEX_P) 15 mg capsule Take 1 Capsule by mouth every morning. Max Daily Amount: 15 mg.    escitalopram oxalate (LEXAPRO) 10 mg tablet Take 1 tablet by mouth once daily    ibuprofen (MOTRIN) 800 mg tablet Take 1 Tablet by mouth every eight (8) hours as needed for Pain. SUMAtriptan (IMITREX) 50 mg tablet Take 1 Tab by mouth once as needed for Migraine for up to 1 dose. You may take one more dose 2 hours later if your migraine is still present.  Do not take more than 2 tabs in 24 hours. ergocalciferol (ERGOCALCIFEROL) 1,250 mcg (50,000 unit) capsule Take 1 Cap by mouth every seven (7) days. (Patient taking differently: Take 50,000 Units by mouth in the morning.)    diphenhydrAMINE (BENADRYL) 25 mg capsule Take 50 mg by mouth every six (6) hours as needed. No current facility-administered medications for this visit. Medical History  Past Medical History:   Diagnosis Date    Abnormal Pap smear of cervix 09/01/2015    ASCUS, +HPV - repap 1 year 2016    Anemia NEC     Encounter for insertion of mirena IUD 1/19/12    3 years ago    Encounter for IUD removal 08/04/2015    HX OTHER MEDICAL     weidom teeth removed    Hypotension     Pap smear for cervical cancer screening 10/04/2016    negative -  repap 1 yr 2017       Immunizations   Immunization History   Administered Date(s) Administered    COVID-19, PFIZER PURPLE top, DILUTE for use, (age 15 y+), IM, 30mcg/0.3mL 04/10/2021, 01/06/2022    DTaP 06/14/2004, 02/17/2013    HPV 07/10/2007    Hep A Vaccine 07/10/2007    Hep B Vaccine 09/10/1996, 08/02/1997, 07/23/1998    Influenza Vaccine Whole 10/05/2011    MMR 06/03/1993, 02/26/1996    Meningococcal (MCV4P) Vaccine 04/12/2007       Objective   Vital Signs  Visit Vitals  Wt 210 lb (95.3 kg)   BMI 36.05 kg/m²       Physical Examination  Physical Exam  Constitutional:       General: She is not in acute distress. HENT:      Head: Normocephalic. Cardiovascular:      Rate and Rhythm: Normal rate and regular rhythm. Pulses: Normal pulses. Heart sounds: No murmur heard. No friction rub. No gallop. Pulmonary:      Effort: Pulmonary effort is normal. No respiratory distress. Breath sounds: Normal breath sounds. No stridor. No wheezing, rhonchi or rales. Abdominal:      General: Abdomen is flat. There is no distension. Palpations: Abdomen is soft. Tenderness: There is no abdominal tenderness. Musculoskeletal:      Cervical back: Neck supple. Right lower leg: No edema. Left lower leg: No edema. Comments: Moving all extremities spontaneously, normal gait   Skin:     General: Skin is warm and dry. Neurological:      General: No focal deficit present. Mental Status: She is alert. Psychiatric:         Mood and Affect: Mood normal.        Assessment and Plan   Phu Oconnor is a 28 y.o. who presents for weight loss follow-up. Obesity:  Starting weight: 217 lb (6/20/22)  Weight at last visit: 209 lb (7/20/22)  Current weight: 210 lbs (-7 lbs, -3.2%)  Shared decision making, pt would like to work on diet and exercise before adjusting medication (skipping breakfast, oranges instead of orange juice, eating lighter meal at dinnertime, gym membership)  Tolerating medication well, continue phentermine 15 mg daily   Will follow up in 1 month to monitor weight loss progress and medication adjustment if needed      Depression:  Stable, continue Lexapro 10 mg           I have discussed the aforementioned diagnoses and plan with the patient in detail. I have provided information in person and/or in AVS. All questions answered prior to discharge.       I discussed this patient with Dr. Kylah Saldivar (Attending Physician)     Signed By:  2007 [facility]

## 2022-08-30 NOTE — PATIENT INSTRUCTIONS
Goals for next visit:    - Make lunch first and largest meal of the day    - Light dinner    - Keep eating hours from 12PM - 9PM (trial of intermittent fasting)    - Start exercise by walking at children's sporting practices

## 2022-11-30 ENCOUNTER — OFFICE VISIT (OUTPATIENT)
Dept: FAMILY MEDICINE CLINIC | Age: 32
End: 2022-11-30
Payer: COMMERCIAL

## 2022-11-30 VITALS
DIASTOLIC BLOOD PRESSURE: 66 MMHG | OXYGEN SATURATION: 100 % | SYSTOLIC BLOOD PRESSURE: 102 MMHG | HEIGHT: 64 IN | TEMPERATURE: 98.4 F | BODY MASS INDEX: 35.77 KG/M2 | RESPIRATION RATE: 18 BRPM | WEIGHT: 209.5 LBS | HEART RATE: 62 BPM

## 2022-11-30 DIAGNOSIS — N92.0 MENORRHAGIA WITH REGULAR CYCLE: ICD-10-CM

## 2022-11-30 DIAGNOSIS — N39.9 URINARY PROBLEM IN FEMALE: ICD-10-CM

## 2022-11-30 DIAGNOSIS — R53.83 FATIGUE, UNSPECIFIED TYPE: ICD-10-CM

## 2022-11-30 DIAGNOSIS — E66.09 CLASS 2 OBESITY DUE TO EXCESS CALORIES WITHOUT SERIOUS COMORBIDITY WITH BODY MASS INDEX (BMI) OF 37.0 TO 37.9 IN ADULT: ICD-10-CM

## 2022-11-30 DIAGNOSIS — R14.0 ABDOMINAL BLOATING: ICD-10-CM

## 2022-11-30 DIAGNOSIS — R11.0 NAUSEA: ICD-10-CM

## 2022-11-30 DIAGNOSIS — R31.9 HEMATURIA, UNSPECIFIED TYPE: ICD-10-CM

## 2022-11-30 DIAGNOSIS — T75.3XXA MOTION SICKNESS, INITIAL ENCOUNTER: Primary | ICD-10-CM

## 2022-11-30 LAB
BILIRUB UR QL STRIP: NEGATIVE
GLUCOSE UR-MCNC: NEGATIVE MG/DL
HCG URINE, QL. (POC): NEGATIVE
KETONES P FAST UR STRIP-MCNC: NEGATIVE MG/DL
PH UR STRIP: 7 [PH] (ref 4.6–8)
PROT UR QL STRIP: NEGATIVE
SP GR UR STRIP: 1.02 (ref 1–1.03)
UA UROBILINOGEN AMB POC: NORMAL (ref 0.2–1)
URINALYSIS CLARITY POC: NORMAL
URINALYSIS COLOR POC: YELLOW
URINE BLOOD POC: NORMAL
URINE LEUKOCYTES POC: NEGATIVE
URINE NITRITES POC: NEGATIVE
VALID INTERNAL CONTROL?: YES

## 2022-11-30 PROCEDURE — 81003 URINALYSIS AUTO W/O SCOPE: CPT | Performed by: FAMILY MEDICINE

## 2022-11-30 PROCEDURE — 81025 URINE PREGNANCY TEST: CPT | Performed by: FAMILY MEDICINE

## 2022-11-30 RX ORDER — PHENTERMINE HYDROCHLORIDE 15 MG/1
15 CAPSULE ORAL
Qty: 30 CAPSULE | Refills: 0 | Status: SHIPPED | OUTPATIENT
Start: 2022-11-30

## 2022-11-30 RX ORDER — SCOLOPAMINE TRANSDERMAL SYSTEM 1 MG/1
1 PATCH, EXTENDED RELEASE TRANSDERMAL
Qty: 4 PATCH | Refills: 0 | Status: SHIPPED | OUTPATIENT
Start: 2022-11-30

## 2022-11-30 NOTE — PROGRESS NOTES
Chief Complaint   Patient presents with    Follow-up     Weight loss. Visit Vitals  /66 (BP 1 Location: Left upper arm, BP Patient Position: Sitting, BP Cuff Size: Large adult)   Pulse 62   Temp 98.4 °F (36.9 °C) (Temporal)   Resp 18   Ht 5' 4\" (1.626 m)   Wt 209 lb 8 oz (95 kg)   SpO2 100%   BMI 35.96 kg/m²     1. Have you been to the ER, urgent care clinic since your last visit? Hospitalized since your last visit? No    2. Have you seen or consulted any other health care providers outside of the [address] [place] [address] since your last visit? Include any pap smears or colon screening.  No

## 2022-11-30 NOTE — PROGRESS NOTES
History of Present Illness:     Chief Complaint   Patient presents with    Follow-up     Weight loss. Sahil Huggins is a 28 y.o. female     C/o motion sickness  Always an issue for her, but more than usual  Took a pregnancy test at home and it was neg  LMP 11/12/22  Worries because she will be flying     Heavy periods, using period panty and cup  Days 2-3 are very heavy  Periods usually last 4-5 days, cramping 1-2 days  Per pt, recently had a pelvic US and thinks it was normal    C/o fatigue  Worries about her weight being held in mid section  Difficulty sleeping  Mood is OK, overwhelmed with work, moving, kids    Obesity  Heaviest weight: 217 lbs   Current weight: 210 lbs  Started Phentermine 15mg daily  Was taking it sparingly, last filled 8/30/22    Not following her diet or exercise due to recent stressors  Will close on her house next week    PMH (REVIEWED):  Past Medical History:   Diagnosis Date    Abnormal Pap smear of cervix 09/01/2015    ASCUS, +HPV - repap 1 year 2016    Anemia NEC     Encounter for insertion of mirena IUD 1/19/12    3 years ago    Encounter for IUD removal 08/04/2015    HX OTHER MEDICAL     weidom teeth removed    Hypotension     Pap smear for cervical cancer screening 10/04/2016    negative -  repap 1 yr 2017       Current Medications/Allergies (REVIEWED):     Current Outpatient Medications on File Prior to Visit   Medication Sig Dispense Refill    ferrous sulfate (IRON PO) Take  by mouth.      escitalopram oxalate (LEXAPRO) 10 mg tablet Take 1 tablet by mouth once daily 30 Tablet 0    ibuprofen (MOTRIN) 800 mg tablet Take 1 Tablet by mouth every eight (8) hours as needed for Pain. 30 Tablet 1    SUMAtriptan (IMITREX) 50 mg tablet Take 1 Tab by mouth once as needed for Migraine for up to 1 dose. You may take one more dose 2 hours later if your migraine is still present. Do not take more than 2 tabs in 24 hours.  10 Tablet 0    ergocalciferol (ERGOCALCIFEROL) 1,250 mcg (50,000 unit) capsule Take 1 Cap by mouth every seven (7) days. (Patient taking differently: Take 50,000 Units by mouth daily.) 8 Cap 0    diphenhydrAMINE (BENADRYL) 25 mg capsule Take 50 mg by mouth every six (6) hours as needed. No current facility-administered medications on file prior to visit. No Known Allergies      Review of Systems:     Denies fever, chills, sweats  Denies chest pain, BUCKNER, palpitations, LE edema  Denies cough, sputum production, SOB, pleuritic chest pain, wheezing  Denies anxiety or depression      Objective:     Vitals:    11/30/22 1333   BP: 102/66   Pulse: 62   Resp: 18   Temp: 98.4 °F (36.9 °C)   TempSrc: Temporal   SpO2: 100%   Weight: 209 lb 8 oz (95 kg)   Height: 5' 4\" (1.626 m)       Physical Exam:  General appearance - alert, well appearing, and in no distress and overweight  Mental status - alert, oriented to person, place, and time, depressed mood, affect appropriate tomood  Neurological - alert, oriented, normal speech, no focal findings or movement disorder noted    Recent Labs:  Recent Results (from the past 12 hour(s))   AMB POC URINE PREGNANCY TEST, VISUAL COLOR COMPARISON    Collection Time: 11/30/22  2:22 PM   Result Value Ref Range    VALID INTERNAL CONTROL POC Yes     HCG urine, Ql. (POC) Negative Negative   AMB POC URINALYSIS DIP STICK AUTO W/O MICRO    Collection Time: 11/30/22  2:22 PM   Result Value Ref Range    Color (UA POC) Yellow     Clarity (UA POC) Slightly Cloudy     Glucose (UA POC) Negative Negative    Bilirubin (UA POC) Negative Negative    Ketones (UA POC) Negative Negative    Specific gravity (UA POC) 1.020 1.001 - 1.035    Blood (UA POC) Trace Negative    pH (UA POC) 7.0 4.6 - 8.0    Protein (UA POC) Negative Negative    Urobilinogen (UA POC) 0.2 mg/dL 0.2 - 1    Nitrites (UA POC) Negative Negative    Leukocyte esterase (UA POC) Negative Negative       Assessment and Plan:       ICD-10-CM ICD-9-CM    1.  Motion sickness, initial encounter T75. 3XXA 994.6 scopolamine (TRANSDERM-SCOP) 1 mg over 3 days pt3d      2. Class 2 obesity due to excess calories without serious comorbidity with body mass index (BMI) of 37.0 to 37.9 in adult  E66.09 278.00 phentermine (ADIPEX_P) 15 mg capsule    Z68.37 V85.37       3. Nausea  R11.0 787.02       4. Abdominal bloating  R14.0 787.3 CBC W/O DIFF      METABOLIC PANEL, COMPREHENSIVE      METABOLIC PANEL, COMPREHENSIVE      CBC W/O DIFF      5. Menorrhagia with regular cycle  N92.0 626.2 CBC W/O DIFF      TSH 3RD GENERATION      TSH 3RD GENERATION      CBC W/O DIFF      6. Fatigue, unspecified type  R53.83 780.79 CBC W/O DIFF      METABOLIC PANEL, COMPREHENSIVE      TSH 3RD GENERATION      TSH 3RD GENERATION      METABOLIC PANEL, COMPREHENSIVE      CBC W/O DIFF      7.  Urinary problem in female  N39.9 V47.4 AMB POC URINE PREGNANCY TEST, VISUAL COLOR COMPARISON      AMB POC URINALYSIS DIP STICK AUTO W/O MICRO            Obesity  Co-morbidities: chronic low back pain, depression  Starting/ Heaviest weight: 217 lbs (6/20/22)  Current weight: 209lbs (down 1 lb since last visit)  BMI: 36     Wt loss plan:  - Recent stressors are a barrier currently, but she will close on house and have life changes that will help  - Will resume Phentermine 15mg daily; encouraged to take as prescribed daily  - Pt will resume working on her diet and restarting exercise     PDMP reviewed; appropriate  S/p tubal ligation for birth control  UPT negative    Will trial Scopolamine for motion sickness    Checking labs for fatigue and concerns of abdominal bloating  Pt very concerned that there is more causing her abdominal wt gain than just weight, worries about fibroids  Pt did have pelvic US by GYN, will request records     Counseled on flu vaccine  Would like to postpone until after her upcoming trip     Follow up: 1-2 mo for weight loss    Precious Merchant MD    We discussed the expected course, resolution and complications of the diagnosis(es) in detail. Medication risks, benefits, costs, interactions, and alternatives were discussed as indicated. I advised her to contact the office if her condition worsens, changes or fails to improve as anticipated. She expressed understanding with the diagnosis(es) and plan.

## 2022-11-30 NOTE — Clinical Note
Gilberto Figueredo,  Can you send the urine from yesterday for a urine culture? I just remembered and placed the order.    RACHELLE

## 2022-12-01 DIAGNOSIS — R31.9 HEMATURIA, UNSPECIFIED TYPE: ICD-10-CM

## 2022-12-01 DIAGNOSIS — N39.9 URINARY PROBLEM IN FEMALE: ICD-10-CM

## 2022-12-01 LAB
ALBUMIN SERPL-MCNC: 3.8 G/DL (ref 3.5–5)
ALBUMIN/GLOB SERPL: 1 {RATIO} (ref 1.1–2.2)
ALP SERPL-CCNC: 72 U/L (ref 45–117)
ALT SERPL-CCNC: 16 U/L (ref 12–78)
ANION GAP SERPL CALC-SCNC: 5 MMOL/L (ref 5–15)
AST SERPL-CCNC: 10 U/L (ref 15–37)
BILIRUB SERPL-MCNC: 0.3 MG/DL (ref 0.2–1)
BUN SERPL-MCNC: 9 MG/DL (ref 6–20)
BUN/CREAT SERPL: 12 (ref 12–20)
CALCIUM SERPL-MCNC: 8.9 MG/DL (ref 8.5–10.1)
CHLORIDE SERPL-SCNC: 106 MMOL/L (ref 97–108)
CO2 SERPL-SCNC: 28 MMOL/L (ref 21–32)
CREAT SERPL-MCNC: 0.76 MG/DL (ref 0.55–1.02)
ERYTHROCYTE [DISTWIDTH] IN BLOOD BY AUTOMATED COUNT: 13.8 % (ref 11.5–14.5)
GLOBULIN SER CALC-MCNC: 3.8 G/DL (ref 2–4)
GLUCOSE SERPL-MCNC: 82 MG/DL (ref 65–100)
HCT VFR BLD AUTO: 39.2 % (ref 35–47)
HGB BLD-MCNC: 11.8 G/DL (ref 11.5–16)
MCH RBC QN AUTO: 28.7 PG (ref 26–34)
MCHC RBC AUTO-ENTMCNC: 30.1 G/DL (ref 30–36.5)
MCV RBC AUTO: 95.4 FL (ref 80–99)
NRBC # BLD: 0 K/UL (ref 0–0.01)
NRBC BLD-RTO: 0 PER 100 WBC
PLATELET # BLD AUTO: 260 K/UL (ref 150–400)
PMV BLD AUTO: 11.1 FL (ref 8.9–12.9)
POTASSIUM SERPL-SCNC: 4.2 MMOL/L (ref 3.5–5.1)
PROT SERPL-MCNC: 7.6 G/DL (ref 6.4–8.2)
RBC # BLD AUTO: 4.11 M/UL (ref 3.8–5.2)
SODIUM SERPL-SCNC: 139 MMOL/L (ref 136–145)
TSH SERPL DL<=0.05 MIU/L-ACNC: 0.57 UIU/ML (ref 0.36–3.74)
WBC # BLD AUTO: 11.3 K/UL (ref 3.6–11)

## 2022-12-02 ENCOUNTER — HOSPITAL ENCOUNTER (EMERGENCY)
Age: 32
Discharge: HOME | End: 2022-12-02
Attending: STUDENT IN AN ORGANIZED HEALTH CARE EDUCATION/TRAINING PROGRAM
Payer: COMMERCIAL

## 2022-12-02 ENCOUNTER — APPOINTMENT (OUTPATIENT)
Dept: ULTRASOUND IMAGING | Age: 32
End: 2022-12-02
Attending: STUDENT IN AN ORGANIZED HEALTH CARE EDUCATION/TRAINING PROGRAM
Payer: COMMERCIAL

## 2022-12-02 VITALS
HEART RATE: 80 BPM | SYSTOLIC BLOOD PRESSURE: 121 MMHG | OXYGEN SATURATION: 98 % | TEMPERATURE: 98.5 F | DIASTOLIC BLOOD PRESSURE: 57 MMHG | RESPIRATION RATE: 16 BRPM

## 2022-12-02 DIAGNOSIS — R11.2 NAUSEA AND VOMITING, UNSPECIFIED VOMITING TYPE: Primary | ICD-10-CM

## 2022-12-02 DIAGNOSIS — R10.13 ABDOMINAL PAIN, EPIGASTRIC: ICD-10-CM

## 2022-12-02 LAB
ALBUMIN SERPL-MCNC: 3.6 G/DL (ref 3.5–5)
ALBUMIN/GLOB SERPL: 0.9 {RATIO} (ref 1.1–2.2)
ALP SERPL-CCNC: 74 U/L (ref 45–117)
ALT SERPL-CCNC: 17 U/L (ref 12–78)
ANION GAP SERPL CALC-SCNC: 8 MMOL/L (ref 5–15)
APPEARANCE UR: CLEAR
AST SERPL-CCNC: 11 U/L (ref 15–37)
BACTERIA URNS QL MICRO: ABNORMAL /HPF
BASOPHILS # BLD: 0 K/UL (ref 0–0.1)
BASOPHILS NFR BLD: 0 % (ref 0–1)
BILIRUB SERPL-MCNC: 0.6 MG/DL (ref 0.2–1)
BILIRUB UR QL: NEGATIVE
BUN SERPL-MCNC: 11 MG/DL (ref 6–20)
BUN/CREAT SERPL: 14 (ref 12–20)
CALCIUM SERPL-MCNC: 8.7 MG/DL (ref 8.5–10.1)
CHLORIDE SERPL-SCNC: 108 MMOL/L (ref 97–108)
CO2 SERPL-SCNC: 22 MMOL/L (ref 21–32)
COLOR UR: ABNORMAL
COMMENT, HOLDF: NORMAL
CREAT SERPL-MCNC: 0.77 MG/DL (ref 0.55–1.02)
DIFFERENTIAL METHOD BLD: ABNORMAL
EOSINOPHIL # BLD: 0 K/UL (ref 0–0.4)
EOSINOPHIL NFR BLD: 0 % (ref 0–7)
EPITH CASTS URNS QL MICRO: ABNORMAL /LPF
ERYTHROCYTE [DISTWIDTH] IN BLOOD BY AUTOMATED COUNT: 13.8 % (ref 11.5–14.5)
GLOBULIN SER CALC-MCNC: 4.2 G/DL (ref 2–4)
GLUCOSE SERPL-MCNC: 103 MG/DL (ref 65–100)
GLUCOSE UR STRIP.AUTO-MCNC: NEGATIVE MG/DL
HCG UR QL: NEGATIVE
HCT VFR BLD AUTO: 36.8 % (ref 35–47)
HGB BLD-MCNC: 11.6 G/DL (ref 11.5–16)
HGB UR QL STRIP: NEGATIVE
HYALINE CASTS URNS QL MICRO: ABNORMAL /LPF (ref 0–2)
IMM GRANULOCYTES # BLD AUTO: 0 K/UL (ref 0–0.04)
IMM GRANULOCYTES NFR BLD AUTO: 0 % (ref 0–0.5)
KETONES UR QL STRIP.AUTO: NEGATIVE MG/DL
LEUKOCYTE ESTERASE UR QL STRIP.AUTO: ABNORMAL
LIPASE SERPL-CCNC: 116 U/L (ref 73–393)
LYMPHOCYTES # BLD: 1 K/UL (ref 0.8–3.5)
LYMPHOCYTES NFR BLD: 9 % (ref 12–49)
MCH RBC QN AUTO: 28.1 PG (ref 26–34)
MCHC RBC AUTO-ENTMCNC: 31.5 G/DL (ref 30–36.5)
MCV RBC AUTO: 89.1 FL (ref 80–99)
MONOCYTES # BLD: 0.3 K/UL (ref 0–1)
MONOCYTES NFR BLD: 3 % (ref 5–13)
NEUTS SEG # BLD: 8.9 K/UL (ref 1.8–8)
NEUTS SEG NFR BLD: 88 % (ref 32–75)
NITRITE UR QL STRIP.AUTO: NEGATIVE
NRBC # BLD: 0 K/UL (ref 0–0.01)
NRBC BLD-RTO: 0 PER 100 WBC
PH UR STRIP: 8.5 [PH] (ref 5–8)
PLATELET # BLD AUTO: 238 K/UL (ref 150–400)
PMV BLD AUTO: 10.9 FL (ref 8.9–12.9)
POTASSIUM SERPL-SCNC: 3.8 MMOL/L (ref 3.5–5.1)
PROT SERPL-MCNC: 7.8 G/DL (ref 6.4–8.2)
PROT UR STRIP-MCNC: NEGATIVE MG/DL
RBC # BLD AUTO: 4.13 M/UL (ref 3.8–5.2)
RBC #/AREA URNS HPF: ABNORMAL /HPF (ref 0–5)
SAMPLES BEING HELD,HOLD: NORMAL
SODIUM SERPL-SCNC: 138 MMOL/L (ref 136–145)
SP GR UR REFRACTOMETRY: 1.01 (ref 1–1.03)
UR CULT HOLD, URHOLD: NORMAL
UROBILINOGEN UR QL STRIP.AUTO: 0.2 EU/DL (ref 0.2–1)
WBC # BLD AUTO: 10.2 K/UL (ref 3.6–11)
WBC URNS QL MICRO: ABNORMAL /HPF (ref 0–4)

## 2022-12-02 PROCEDURE — 96374 THER/PROPH/DIAG INJ IV PUSH: CPT

## 2022-12-02 PROCEDURE — 80053 COMPREHEN METABOLIC PANEL: CPT

## 2022-12-02 PROCEDURE — 99284 EMERGENCY DEPT VISIT MOD MDM: CPT

## 2022-12-02 PROCEDURE — 96375 TX/PRO/DX INJ NEW DRUG ADDON: CPT

## 2022-12-02 PROCEDURE — 81001 URINALYSIS AUTO W/SCOPE: CPT

## 2022-12-02 PROCEDURE — 85025 COMPLETE CBC W/AUTO DIFF WBC: CPT

## 2022-12-02 PROCEDURE — 76705 ECHO EXAM OF ABDOMEN: CPT

## 2022-12-02 PROCEDURE — 96361 HYDRATE IV INFUSION ADD-ON: CPT

## 2022-12-02 PROCEDURE — 96372 THER/PROPH/DIAG INJ SC/IM: CPT

## 2022-12-02 PROCEDURE — 81025 URINE PREGNANCY TEST: CPT

## 2022-12-02 PROCEDURE — 83690 ASSAY OF LIPASE: CPT

## 2022-12-02 PROCEDURE — 36415 COLL VENOUS BLD VENIPUNCTURE: CPT

## 2022-12-02 PROCEDURE — 74011250636 HC RX REV CODE- 250/636: Performed by: STUDENT IN AN ORGANIZED HEALTH CARE EDUCATION/TRAINING PROGRAM

## 2022-12-02 RX ORDER — PROCHLORPERAZINE EDISYLATE 5 MG/ML
5 INJECTION INTRAMUSCULAR; INTRAVENOUS
Status: COMPLETED | OUTPATIENT
Start: 2022-12-02 | End: 2022-12-02

## 2022-12-02 RX ORDER — PROCHLORPERAZINE MALEATE 5 MG
5 TABLET ORAL
Qty: 12 TABLET | Refills: 0 | Status: SHIPPED | OUTPATIENT
Start: 2022-12-02 | End: 2022-12-09

## 2022-12-02 RX ORDER — KETOROLAC TROMETHAMINE 30 MG/ML
15 INJECTION, SOLUTION INTRAMUSCULAR; INTRAVENOUS
Status: COMPLETED | OUTPATIENT
Start: 2022-12-02 | End: 2022-12-02

## 2022-12-02 RX ORDER — FAMOTIDINE 20 MG/1
20 TABLET, FILM COATED ORAL 2 TIMES DAILY
Qty: 20 TABLET | Refills: 0 | Status: SHIPPED | OUTPATIENT
Start: 2022-12-02 | End: 2022-12-12

## 2022-12-02 RX ORDER — DICYCLOMINE HYDROCHLORIDE 10 MG/5ML
20 SOLUTION ORAL
Qty: 473 ML | Refills: 0 | Status: SHIPPED | OUTPATIENT
Start: 2022-12-02

## 2022-12-02 RX ORDER — DICYCLOMINE HYDROCHLORIDE 10 MG/ML
20 INJECTION INTRAMUSCULAR
Status: COMPLETED | OUTPATIENT
Start: 2022-12-02 | End: 2022-12-02

## 2022-12-02 RX ORDER — ONDANSETRON 2 MG/ML
4 INJECTION INTRAMUSCULAR; INTRAVENOUS
Status: COMPLETED | OUTPATIENT
Start: 2022-12-02 | End: 2022-12-02

## 2022-12-02 RX ADMIN — KETOROLAC TROMETHAMINE 15 MG: 30 INJECTION, SOLUTION INTRAMUSCULAR; INTRAVENOUS at 11:28

## 2022-12-02 RX ADMIN — ONDANSETRON 4 MG: 2 INJECTION INTRAMUSCULAR; INTRAVENOUS at 13:51

## 2022-12-02 RX ADMIN — SODIUM CHLORIDE 1000 ML: 9 INJECTION, SOLUTION INTRAVENOUS at 11:24

## 2022-12-02 RX ADMIN — DICYCLOMINE HYDROCHLORIDE 20 MG: 10 INJECTION, SOLUTION INTRAMUSCULAR at 11:29

## 2022-12-02 RX ADMIN — PROCHLORPERAZINE EDISYLATE 5 MG: 5 INJECTION INTRAMUSCULAR; INTRAVENOUS at 11:24

## 2022-12-02 NOTE — ED TRIAGE NOTES
Pt to ED for c/o N/V and upper abdominal pain worsening after eating x1 week.  Took zofran PTA with minimal relief

## 2022-12-02 NOTE — ED PROVIDER NOTES
EMERGENCY DEPARTMENT HISTORY AND PHYSICAL EXAM      Date: 12/2/2022  Patient Name: Jeanna Alejandro    History of Presenting Illness     HPI: Jeanna Alejandro, 28 y.o. female with past medical history of anxiety, migraines, obesity, presents to the ED with cc of nausea, vomiting, abdominal pain. Patient reports she has been experiencing exacerbation of her abdominal pain after each meal over the past week. She localizes abdominal pain to the mid epigastric region. She denies prior history of similar symptoms. States that she has been vomiting up \"bile. \"  Denies any associated fevers or chills. No history of GERD/gastritis/PUD. No history of gallbladder pathology that she is aware of. She denies any urinary complaints. PCP: Fauzia Miller MD    No current facility-administered medications on file prior to encounter. Current Outpatient Medications on File Prior to Encounter   Medication Sig Dispense Refill    phentermine (ADIPEX_P) 15 mg capsule Take 1 Capsule by mouth every morning. Max Daily Amount: 15 mg. 30 Capsule 0    scopolamine (TRANSDERM-SCOP) 1 mg over 3 days pt3d 1 Patch by TransDERmal route every seventy-two (72) hours. 4 Patch 0    ferrous sulfate (IRON PO) Take  by mouth.      escitalopram oxalate (LEXAPRO) 10 mg tablet Take 1 tablet by mouth once daily 30 Tablet 0    ibuprofen (MOTRIN) 800 mg tablet Take 1 Tablet by mouth every eight (8) hours as needed for Pain. 30 Tablet 1    SUMAtriptan (IMITREX) 50 mg tablet Take 1 Tab by mouth once as needed for Migraine for up to 1 dose. You may take one more dose 2 hours later if your migraine is still present. Do not take more than 2 tabs in 24 hours. 10 Tablet 0    ergocalciferol (ERGOCALCIFEROL) 1,250 mcg (50,000 unit) capsule Take 1 Cap by mouth every seven (7) days. (Patient taking differently: Take 50,000 Units by mouth daily.) 8 Cap 0    diphenhydrAMINE (BENADRYL) 25 mg capsule Take 50 mg by mouth every six (6) hours as needed. Past History     Past Medical History:  Past Medical History:   Diagnosis Date    Abnormal Pap smear of cervix 09/01/2015    ASCUS, +HPV - repap 1 year 2016    Anemia NEC     Encounter for insertion of mirena IUD 1/19/12    3 years ago    Encounter for IUD removal 08/04/2015    HX OTHER MEDICAL     weidom teeth removed    Hypotension     Pap smear for cervical cancer screening 10/04/2016    negative -  repap 1 yr 2017       Past Surgical History:  Past Surgical History:   Procedure Laterality Date    HX HEENT         Family History:  Family History   Problem Relation Age of Onset    Hypertension Mother     Hypertension Maternal Grandmother     Hypertension Maternal Grandfather     Diabetes Maternal Grandfather        Social History:  Social History     Tobacco Use    Smoking status: Never    Smokeless tobacco: Never   Vaping Use    Vaping Use: Never used   Substance Use Topics    Alcohol use: No    Drug use: No       Allergies:  No Known Allergies      Review of Systems   Review of Systems   Constitutional:  Negative for chills and fever. HENT:  Negative for congestion and rhinorrhea. Eyes:  Negative for visual disturbance. Respiratory:  Negative for chest tightness and shortness of breath. Cardiovascular:  Negative for chest pain, palpitations and leg swelling. Gastrointestinal:  Positive for abdominal pain, nausea and vomiting. Negative for diarrhea. Genitourinary:  Negative for dysuria, flank pain and hematuria. Musculoskeletal:  Negative for back pain and neck pain. Skin:  Negative for rash. Allergic/Immunologic: Negative for immunocompromised state. Neurological:  Negative for dizziness, speech difficulty, weakness and headaches. Hematological:  Negative for adenopathy. Psychiatric/Behavioral:  Negative for dysphoric mood and suicidal ideas. Physical Exam   Physical Exam  Vitals and nursing note reviewed. Constitutional:       General: She is not in acute distress. Appearance: She is not ill-appearing or toxic-appearing. HENT:      Head: Normocephalic and atraumatic. Nose: Nose normal.      Mouth/Throat:      Mouth: Mucous membranes are moist.   Eyes:      Extraocular Movements: Extraocular movements intact. Pupils: Pupils are equal, round, and reactive to light. Cardiovascular:      Rate and Rhythm: Normal rate and regular rhythm. Pulses: Normal pulses. Pulmonary:      Effort: Pulmonary effort is normal.      Breath sounds: No stridor. No wheezing or rhonchi. Abdominal:      General: Abdomen is flat. There is no distension. Palpations: Abdomen is soft. Tenderness: There is abdominal tenderness in the epigastric area. There is no right CVA tenderness, left CVA tenderness, guarding or rebound. Musculoskeletal:         General: Normal range of motion. Cervical back: Normal range of motion and neck supple. Skin:     General: Skin is warm and dry. Neurological:      General: No focal deficit present. Mental Status: She is alert and oriented to person, place, and time. Psychiatric:         Judgment: Judgment normal.       Diagnostic Study Results     Labs -     Recent Results (from the past 24 hour(s))   HCG URINE, QL. - POC    Collection Time: 12/02/22 10:24 AM   Result Value Ref Range    Pregnancy test,urine (POC) Negative NEG     SAMPLES BEING HELD    Collection Time: 12/02/22 10:25 AM   Result Value Ref Range    SAMPLES BEING HELD 1SST,1DRK GRN     COMMENT        Add-on orders for these samples will be processed based on acceptable specimen integrity and analyte stability, which may vary by analyte.    CBC WITH AUTOMATED DIFF    Collection Time: 12/02/22 10:25 AM   Result Value Ref Range    WBC 10.2 3.6 - 11.0 K/uL    RBC 4.13 3.80 - 5.20 M/uL    HGB 11.6 11.5 - 16.0 g/dL    HCT 36.8 35.0 - 47.0 %    MCV 89.1 80.0 - 99.0 FL    MCH 28.1 26.0 - 34.0 PG    MCHC 31.5 30.0 - 36.5 g/dL    RDW 13.8 11.5 - 14.5 %    PLATELET 440 877 - 400 K/uL    MPV 10.9 8.9 - 12.9 FL    NRBC 0.0 0  WBC    ABSOLUTE NRBC 0.00 0.00 - 0.01 K/uL    NEUTROPHILS 88 (H) 32 - 75 %    LYMPHOCYTES 9 (L) 12 - 49 %    MONOCYTES 3 (L) 5 - 13 %    EOSINOPHILS 0 0 - 7 %    BASOPHILS 0 0 - 1 %    IMMATURE GRANULOCYTES 0 0.0 - 0.5 %    ABS. NEUTROPHILS 8.9 (H) 1.8 - 8.0 K/UL    ABS. LYMPHOCYTES 1.0 0.8 - 3.5 K/UL    ABS. MONOCYTES 0.3 0.0 - 1.0 K/UL    ABS. EOSINOPHILS 0.0 0.0 - 0.4 K/UL    ABS. BASOPHILS 0.0 0.0 - 0.1 K/UL    ABS. IMM. GRANS. 0.0 0.00 - 0.04 K/UL    DF AUTOMATED     METABOLIC PANEL, COMPREHENSIVE    Collection Time: 12/02/22 10:25 AM   Result Value Ref Range    Sodium 138 136 - 145 mmol/L    Potassium 3.8 3.5 - 5.1 mmol/L    Chloride 108 97 - 108 mmol/L    CO2 22 21 - 32 mmol/L    Anion gap 8 5 - 15 mmol/L    Glucose 103 (H) 65 - 100 mg/dL    BUN 11 6 - 20 MG/DL    Creatinine 0.77 0.55 - 1.02 MG/DL    BUN/Creatinine ratio 14 12 - 20      eGFR >60 >60 ml/min/1.73m2    Calcium 8.7 8.5 - 10.1 MG/DL    Bilirubin, total 0.6 0.2 - 1.0 MG/DL    ALT (SGPT) 17 12 - 78 U/L    AST (SGOT) 11 (L) 15 - 37 U/L    Alk.  phosphatase 74 45 - 117 U/L    Protein, total 7.8 6.4 - 8.2 g/dL    Albumin 3.6 3.5 - 5.0 g/dL    Globulin 4.2 (H) 2.0 - 4.0 g/dL    A-G Ratio 0.9 (L) 1.1 - 2.2     LIPASE    Collection Time: 12/02/22 10:25 AM   Result Value Ref Range    Lipase 116 73 - 393 U/L   URINALYSIS W/ RFLX MICROSCOPIC    Collection Time: 12/02/22 10:25 AM   Result Value Ref Range    Color YELLOW/STRAW      Appearance CLEAR CLEAR      Specific gravity 1.011 1.003 - 1.030      pH (UA) 8.5 (H) 5.0 - 8.0      Protein Negative NEG mg/dL    Glucose Negative NEG mg/dL    Ketone Negative NEG mg/dL    Bilirubin Negative NEG      Blood Negative NEG      Urobilinogen 0.2 0.2 - 1.0 EU/dL    Nitrites Negative NEG      Leukocyte Esterase TRACE (A) NEG      WBC 5-10 0 - 4 /hpf    RBC 0-5 0 - 5 /hpf    Epithelial cells MANY (A) FEW /lpf    Bacteria 2+ (A) NEG /hpf    Hyaline cast 0-2 0 - 2 /lpf URINE CULTURE HOLD SAMPLE    Collection Time: 12/02/22 10:25 AM    Specimen: Urine   Result Value Ref Range    Urine culture hold        Urine on hold in Microbiology dept for 2 days. If unpreserved urine is submitted, it cannot be used for addtional testing after 24 hours, recollection will be required. Radiologic Studies -   US ABD LTD   Final Result      1. Mildly heterogeneous echotexture of liver. Correlate with liver function   parameters. 2. Otherwise unremarkable right upper quadrant ultrasound for age. CT Results  (Last 48 hours)      None          CXR Results  (Last 48 hours)      None            Medical Decision Making   IOsmel MD-- am the first provider for this patient, and I am the attending of record for this patient encounter. I reviewed the vital signs, available nursing notes, past medical history, past surgical history, family history and social history. Vital Signs-Reviewed the patient's vital signs. Patient Vitals for the past 24 hrs:   Temp Pulse Resp BP SpO2   12/02/22 1226 98.5 °F (36.9 °C) 80 16 (!) 121/57 98 %   12/02/22 1022 98.3 °F (36.8 °C) 87 22 102/65 100 %     Records Reviewed: Nursing Notes and Old Medical Records    Provider Notes (Medical Decision Making):   Ddx: GERD/gastritis/PUD, pancreatitis, biliary colic versus cholecystitis versus choledocholithiasis, enterocolitis, etc.    Plan: Labs, UA, urine pregnancy, right upper quadrant ultrasound. Will medicate patient with antiemetics, IVF, analgesics. Will reassess. ED Course as of 12/02/22 1347   Fri Dec 02, 2022   1342 Work-up in the ED is reassuring, without significant abnormality to explain patient's current presentation. Patient reassessed at this time-reports feeling better. Possible etiologies including GERD/gastritis/PUD vs biliary dyskinesia discussed with the patient. Will dc with rx for compazine, pepcid, and bentyl. Will refer to GI for ongoing evaluation and management.  Patient agreeable with plan. [JM]      ED Course User Index  [JM] Osmar Aponte MD       ED Course:   Initial assessment performed. The patient's presenting problems have been discussed, and they are in agreement with the care plan formulated and outlined with them. I have encouraged them to ask questions as they arise throughout their visit. Sherley Craig MD      Disposition:  DC      DISCHARGE PLAN:  1. Current Discharge Medication List        START taking these medications    Details   famotidine (Pepcid) 20 mg tablet Take 1 Tablet by mouth two (2) times a day for 10 days. Qty: 20 Tablet, Refills: 0  Start date: 12/2/2022, End date: 12/12/2022      prochlorperazine (Compazine) 5 mg tablet Take 1 Tablet by mouth every eight (8) hours as needed for Nausea for up to 7 days. Qty: 12 Tablet, Refills: 0  Start date: 12/2/2022, End date: 12/9/2022      dicyclomine (BENTYL) 10 mg/5 mL soln oral solution Take 10 mL by mouth four (4) times daily as needed for Pain. Qty: 473 mL, Refills: 0  Start date: 12/2/2022           2. Follow-up Information       Follow up With Specialties Details Why Contact Info    [facility]  Schedule an appointment as soon as possible for a visit   [facility] [address]  Schedule an appointment as soon as possible for a visit   [address]  [facility] [place] [facility]  [phone]          3. Return to ED if worse     Diagnosis     Clinical Impression:   1. Nausea and vomiting, unspecified vomiting type    2. Abdominal pain, epigastric        Attestations:    Sherley Craig MD    Please note that this dictation was completed with [employer], the [employer] voice recognition software. Quite often unanticipated grammatical, syntax, homophones, and other interpretive errors are inadvertently transcribed by the computer software. Please disregard these errors.   Please excuse any errors that have escaped final proofreading. Thank you.

## 2022-12-02 NOTE — ED NOTES
Pt states she is ready to go home. Discharge instructions reviewed with patient. Opportunity to ask questions given. Pt appeared restless during discharge but reports she is just ready to go.

## 2022-12-03 LAB
BACTERIA SPEC CULT: NORMAL
SERVICE CMNT-IMP: NORMAL

## 2023-01-26 ENCOUNTER — OFFICE VISIT (OUTPATIENT)
Dept: FAMILY MEDICINE CLINIC | Age: 33
End: 2023-01-26
Payer: COMMERCIAL

## 2023-01-26 VITALS
DIASTOLIC BLOOD PRESSURE: 71 MMHG | HEIGHT: 64 IN | WEIGHT: 200.6 LBS | BODY MASS INDEX: 34.25 KG/M2 | HEART RATE: 83 BPM | OXYGEN SATURATION: 98 % | SYSTOLIC BLOOD PRESSURE: 109 MMHG | RESPIRATION RATE: 17 BRPM | TEMPERATURE: 98.8 F

## 2023-01-26 DIAGNOSIS — E66.09 CLASS 1 OBESITY DUE TO EXCESS CALORIES WITHOUT SERIOUS COMORBIDITY WITH BODY MASS INDEX (BMI) OF 34.0 TO 34.9 IN ADULT: Primary | ICD-10-CM

## 2023-01-26 DIAGNOSIS — F32.1 CURRENT MODERATE EPISODE OF MAJOR DEPRESSIVE DISORDER WITHOUT PRIOR EPISODE (HCC): ICD-10-CM

## 2023-01-26 PROCEDURE — 99214 OFFICE O/P EST MOD 30 MIN: CPT | Performed by: FAMILY MEDICINE

## 2023-01-26 RX ORDER — PHENTERMINE HYDROCHLORIDE 15 MG/1
15 CAPSULE ORAL
Qty: 30 CAPSULE | Refills: 1 | Status: SHIPPED | OUTPATIENT
Start: 2023-02-17

## 2023-01-26 NOTE — PROGRESS NOTES
History of Present Illness:     Chief Complaint   Patient presents with    Weight Loss    Depression     Discuss discontinuing medication. Bard Talley is a 33 y.o. female     Depression  Wants to discontinue Lexapro  She admits to not taking it  Mood is better  House is in order, more energy, feels happy  Stressors are down  - Recently closed on a house  - New job, loves her job    Obesity  Down 9 lbs since last visit  Home scale 198 lbs  Currently taking Phentermine 15mg daily   Diet - eating smaller portions, more veges  Exercise - Trying to find a gym in her area    PMH (REVIEWED):  Past Medical History:   Diagnosis Date    Abnormal Pap smear of cervix 09/01/2015    ASCUS, +HPV - repap 1 year 2016    Anemia NEC     Encounter for insertion of mirena IUD 1/19/12    3 years ago    Encounter for IUD removal 08/04/2015    HX OTHER MEDICAL     weidom teeth removed    Hypotension     Pap smear for cervical cancer screening 10/04/2016    negative -  repap 1 yr 2017       Current Medications/Allergies (REVIEWED):     Current Outpatient Medications on File Prior to Visit   Medication Sig Dispense Refill    scopolamine (TRANSDERM-SCOP) 1 mg over 3 days pt3d 1 Patch by TransDERmal route every seventy-two (72) hours. 4 Patch 0    ferrous sulfate (IRON PO) Take  by mouth. ibuprofen (MOTRIN) 800 mg tablet Take 1 Tablet by mouth every eight (8) hours as needed for Pain. 30 Tablet 1    ergocalciferol (ERGOCALCIFEROL) 1,250 mcg (50,000 unit) capsule Take 1 Cap by mouth every seven (7) days. 8 Cap 0    SUMAtriptan (IMITREX) 50 mg tablet Take 1 Tab by mouth once as needed for Migraine for up to 1 dose. You may take one more dose 2 hours later if your migraine is still present. Do not take more than 2 tabs in 24 hours. (Patient not taking: Reported on 1/26/2023) 10 Tablet 0     No current facility-administered medications on file prior to visit.        No Known Allergies      Review of Systems:     Denies CP, SOB, palpitations  Denies depressed mood, anxiety    Objective:     Vitals:    01/26/23 1604   BP: 109/71   Pulse: 83   Resp: 17   Temp: 98.8 °F (37.1 °C)   TempSrc: Oral   SpO2: 98%   Weight: 200 lb 9.6 oz (91 kg)   Height: 5' 4\" (1.626 m)       Physical Exam:  General appearance - alert, well appearing, and in no distress  Mental status - normal mood, behavior, speech, dress, motor activity, and thought processes  Heart - normal rate, regular rhythm, normal S1, S2, no murmurs, rubs, clicks or gallops    Recent Labs:  No results found for this or any previous visit (from the past 12 hour(s)). Assessment and Plan:       ICD-10-CM ICD-9-CM    1. Class 1 obesity due to excess calories without serious comorbidity with body mass index (BMI) of 34.0 to 34.9 in adult  E66.09 278.00 phentermine (ADIPEX_P) 15 mg capsule    Z68.34 V85.34       2. Current moderate episode of major depressive disorder without prior episode (HCC)  F32.1 296.22           Obesity, improving  Co-morbidities: chronic low back pain, depression  Starting/ Heaviest weight: 217 lbs (6/20/22)  Current weight: 200lbs (-17lbs, 7.8 %)  BMI: 36 --> 34.4     Wt loss plan:  - Continue Phentermine 15mg daily  - Continue with healthy diet  - Encouraged to incorporate regular exercise; pt has a plan to start, looking for a gym     PDMP reviewed; appropriate  S/p tubal ligation for birth control    Depression, improving  Will DC Lexapro 10mg  No need to wean, pt taking inconsistently    Follow up: 2 months for wt check    Lazarus Aldo, MD    We discussed the expected course, resolution and complications of the diagnosis(es) in detail. Medication risks, benefits, costs, interactions, and alternatives were discussed as indicated. I advised her to contact the office if her condition worsens, changes or fails to improve as anticipated. She expressed understanding with the diagnosis(es) and plan.

## 2023-01-26 NOTE — PROGRESS NOTES
Chief Complaint   Patient presents with    Weight Loss    Depression     Discuss discontinuing medication. Visit Vitals  /71 (BP 1 Location: Right arm, BP Patient Position: Sitting, BP Cuff Size: Adult)   Pulse 83   Temp 98.8 °F (37.1 °C) (Oral)   Resp 17   Ht 5' 4\" (1.626 m)   Wt 200 lb 9.6 oz (91 kg)   SpO2 98%   BMI 34.43 kg/m²     1. \"Have you been to the ER, urgent care clinic since your last visit? Hospitalized since your last visit? \"  12/02/2022 [facility] Vomiting    2. \"Have you seen or consulted any other health care providers outside of the [address] [place] [address] since your last visit? \"  Therapist- Chaz Nugent      3. For patients aged 39-70: Has the patient had a colonoscopy / FIT/ Cologuard? NA - based on age      If the patient is female:    4. For patients aged 41-77: Has the patient had a mammogram within the past 2 years? NA - based on age or sex      11. For patients aged 21-65: Has the patient had a pap smear?  Yes - no Care Gap present

## 2023-02-08 ENCOUNTER — TELEPHONE (OUTPATIENT)
Dept: FAMILY MEDICINE CLINIC | Age: 33
End: 2023-02-08

## 2023-02-08 NOTE — TELEPHONE ENCOUNTER
Prior Authorization    Prior auth submitted via [email or web address]    Medication Name/ Dosage: Phentermine HCl 15mg Capsules     Quantity/Day Supply: 30 Capsules/30 Days    DX: E66.09     Reference Number: [id number] - [place] Case ID: [id number]    Outcome: Approved through 05/08/2023    If Denied Reason for Denial:   [address] notified of [place] Approval.   This writer contacted Patient, two patient identifiers verified, patient notified of [place] Approval and Verbalized understanding. Patient denies questions or concerns at this time.

## 2023-02-20 NOTE — PROGRESS NOTES
[facility]  [email or web address]  [phone]    Leah Gutierrez MD, FACOG     Follow-up OB visit    Chief Complaint   Patient presents with    Routine Prenatal Visit       Vitals:    04/28/17 1413   BP: 114/70   Weight: 188 lb (85.3 kg)   Height: 5' 4\" (1.626 m)       Patient Active Problem List    Diagnosis Date Noted    Supervision of other normal pregnancy, antepartum 03/07/2017       The patient reports the following concerns: Patient reports that she would like to know if she can ride the kid rides at [email or web address]. Flu vaccine: patient refused  Tdap: N/A    See PN flowsheet for exam    23 y.o. Ru Eva 20w5d   Encounter Diagnoses   Name Primary?  Prenatal care of multigravida, antepartum Yes    Poor weight gain of pregnancy, second trimester      Rec avoid rides where trauma or shifting or speed changes involved     [] SAB/bleeding precautions reviewed   [] PTL/PPROM precautions reviewed   [] Labor precautions reviewed   [] Fetal kick counts discussed   [] Labs reviewed with patient   [] Nayan Wolfeler precautions reviewed   [] Consent reviewed   [] Handouts given to pt   [] Glucola handout    [] GBS/labor/Magic Hour handout   []    []    []    []    Follow-up Disposition:  Return in about 4 weeks (around 5/26/2017) for Follow up OB visit. No orders of the defined types were placed in this encounter. Leah Gutierrez MD    Physician review of ultrasound performed by technician    Today's ultrasound report and images were reviewed and discussed with the patient. Please see images and imaging report entered by technician in PACS for more detail and progress note and diagnosis entered by MD.    Annetta Mcclain MD  I discussed with the patient the limitations of ultrasound and that imaging can not rule out all birth defects, chromosomal problems, or other problems with the baby. [facility]

## 2023-05-22 RX ORDER — IBUPROFEN 800 MG/1
800 TABLET ORAL EVERY 8 HOURS PRN
COMMUNITY
Start: 2022-06-20

## 2023-05-22 RX ORDER — ERGOCALCIFEROL 1.25 MG/1
50000 CAPSULE ORAL
COMMUNITY
Start: 2021-04-17

## 2023-05-22 RX ORDER — SCOLOPAMINE TRANSDERMAL SYSTEM 1 MG/1
1 PATCH, EXTENDED RELEASE TRANSDERMAL
COMMUNITY
Start: 2022-11-30

## 2023-05-22 RX ORDER — SUMATRIPTAN 50 MG/1
TABLET, FILM COATED ORAL
COMMUNITY
Start: 2022-05-16

## 2023-05-22 RX ORDER — PHENTERMINE HYDROCHLORIDE 15 MG/1
15 CAPSULE ORAL
COMMUNITY
Start: 2023-02-17

## 2023-11-06 NOTE — PROGRESS NOTES
Discharge Summary    Pt has attended 5, cancelled 3, and no shown 0 visits in Physical Therapy. John Aguilera Xiao    2/22/1954  Referring Physician:  Dr. Man Núñez  Diagnosis: Lumbar spondylosis (M47.816)  Myalgia (M79.10)  DDD (degenerative disc disease), lumbosacral (M51.37)  Lumbar foraminal stenosis (M48.061)  Facet syndrome, lumbar (M47.816)  Biomechanical lesion (M99.9)  Bulge of lumbar disc without myelopathy (M51.36)  Mechanical low back pain (M54.59)  Initial Evaluation Date: 8/4/2023  Date of Surgery: None for this diagnosis   Authorized # of visits 12 by 7/2024    Precautions/Hx: OA, B carotid stenosis, compression fx c-spine, GERD, mitral regurgitation, medial meniscus tear, palpitations,    8/9/2023 B L4-5 and L5-S1 facet injection    SUBJECTIVE:     Pt states that 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 He notes that he was able to sleep supine in bed where he has been sleeping on the couch prior. He tried lying on his sides and had some increased pain. Visit count 11/2/2023 1/8 5/8   Date 8/4/2023 11/6/2023    LBP/ B SI     Pain Range 1-2 to 7/10 0-2/10   Pain Ave 4-5/10 1-2/10     Pt has received skilled physical therapy intervention with good decrease of pain complaints on VAS as noted above. Pt displays improved: lumbar ROM; B hip ROM; B LE flexibility and sciatic neural mobility. Pt doing very well with low to no LBP and return to his work as a . Pt remains fearful of lumbar mobility especially any flexion past neutral.  Discussed that he is likely doing some components of flexion with his active job. Pt assessed and demonstrates decreased hip IR B. Pt instructed in self mobilization. Pt demonstrates improved function as noted as progression towards goals and improved functional score. See objective discharge data below in tables with initial evaluation data.  Pt has met goals, is independent with HEP and ready to be Morales Nelson is a 28 y.o. female    Chief Complaint   Patient presents with    Weight Loss         1. Have you been to the ER, urgent care clinic since your last visit? Hospitalized since your last visit? No  2. Have you seen or consulted any other health care providers outside of the [address] [place] [address] since your last visit? Include any pap smears or colon screening. No    Visit Vitals  BP 97/62 (BP 1 Location: Right arm, BP Patient Position: Sitting)   Pulse 78   Resp 16   Ht 5' 4\" (1.626 m)   Wt 210 lb (95.3 kg)   SpO2 97%   BMI 36.05 kg/m²     3 most recent PHQ Screens 7/20/2022   Little interest or pleasure in doing things Not at all   Feeling down, depressed, irritable, or hopeless Not at all   Total Score PHQ 2 0   Trouble falling or staying asleep, or sleeping too much -   Feeling tired or having little energy -   Poor appetite, weight loss, or overeating -   Feeling bad about yourself - or that you are a failure or have let yourself or your family down -   Trouble concentrating on things such as school, work, reading, or watching TV -   Moving or speaking so slowly that other people could have noticed; or the opposite being so fidgety that others notice -   Thoughts of being better off dead, or hurting yourself in some way -   PHQ 9 Score -   How difficult have these problems made it for you to do your work, take care of your home and get along with others -     Health Maintenance Due   Topic Date Due    Hepatitis C Screening  Never done    Cervical cancer screen  12/06/2021    COVID-19 Vaccine (3 - Booster for Pfizer series) 06/06/2022     GYN is Dr. Jorge Suarez ([facility]), had pap done and an ultrasound. discharged to HEP. Post Oswestry Disability Index Score  Post Score: 30 % (11/5/2023 10:55 PM)    18 % improvement      OBJECTIVE:       Treatment performed this date:    Therapeutic Exercise:  Visit #   3/8 4/8 5/8   Position Exercise HEP 9/1/2023  10/30/2023  11/6/2023    Supine  Sciatic nerve glide H 10x 10x     Cross leg piriformis H 3x 3x X 10x cues for exhale    SKC H X 3x 20 sec 3x 3x    DKC   5x only min past neutral 5x    Hesch B hip IR self tx towels 5 min H  x     Thoracic decompression    X 5 min    B shldr flexed hands clasped        Chin tuck        Thoracic extn over towel 3 min w shldr flex        B hip IR self mob H  X 3x30s    Sidelying  Trunk rotation book H X 10x     Sitting Leg pumps nerve glide H 10x 10x     Piriformis stretch H 3x 3x    Many ROM and strength for assessment   X     Ther Act Function  X lifting     Neuro Re-ed  X see assessment  Progression of flex/extn mobility Importance of hip extn strength to avoid overuse and tightening of hams   Man Tx MET               H = HEP. Pt given copies of this exercise for home program.  X = Exercises done this date - pt verbalized understanding and demonstrated competence. All exercises done B unless otherwise indicated. Pt advised to discontinue exercises that increase pain and to call or return to therapist to discuss. Each intervention above is specifically prescribed to address the patients identified impairments, activity limitations, and participation restrictions. ASSESSMENT:     Physical Therapy Goals: From 8/4/2023 to 2/3/2024  - Created with patient input during initial evaluation   1. Pt will be independent in beginning level of HEP for stretching, posture and strengthening. 2. Pt will be able to lift 2 gallons of paint without increased symptoms. 3. Pt will be able to use paint roller for 4 hours on and off to work without increased symptoms. 4. Pt will be able to transfer on/off toilet without increased symptoms.   5. Pt will be able to stand for 20 min x 2 for work without increased symptoms. PLAN OF CARE:      Plan:  Discharge to [facility]    Thank you for your referral. If you have any questions, please contact me at Dept: [phone]. Sincerely,  Electronically signed by therapist: Luis Ahn, PT        Charged Units Units Minutes    Ther Ex 2 25   Neuro 1 15   Ther Activity     Gait     Man Tx          Total Timed  40   Total Tx Time  40           __________________________________________________________________________________________      21st Century Cures Act Notice to Patient: Medical documents like this are made available to patients in the interest of transparency. However, be advised this is a medical document and it is intended as [id number] communication between your medical providers. This medical document may contain abbreviations, assessments, medical data, and results or other terms that are unfamiliar. Medical documents are intended to carry relevant information, facts as evident, and the clinical opinion of the practitioner. As such, this medical document may be written in language that appears blunt or direct. You are encouraged to contact your medical provider and/or [facility] if you have any questions about this medical document.   Objective Initial Evaluation Data 8/4/2023:    Oswestry Disability Index Score  Score: 48 % (8/6/2023  1:58 PM)    Gait:        Deviations: loss of trunk extn and rotation       Devices: none       Assistance: none      Posture 8/4/2023   Alignment Loss of lumbar extn, L trunk shift       Sensation 8/4/2023   Dermatomes Light Touch    Proximal medial thigh R (L2)  wnl   Proximal medial thigh L (L2) wnl   Above knee R (L3) wnl   Above knee L (L3) wnl   Medial arch R(L4) wnl   Medial arch L (L4) wnl   Between 1st - 2nd toes R (L5) wnl   Between 1st - 2nd toes L (L5) wnl   Lateral border of foot R (S1) wnl   Lateral border of foot L (S1) wnl Popliteal fossa R (S2) wnl   Popliteal fossa L(S2) wnl       Abdominals 8/4/2023   Tone  good       Lumbopelvic 8/4/2023 10/30/2023    Control  Poor d/t lack of mobility and fear avoidance Poor (+) d/t lack of mobility and fear avoidance        ROM Lumbar 8/4/2023 10/30/2023    Flexion Max, refuses Mod-max, refuses to go further   Extension max max   R SB mod min   L SB mod min   R Rotation mod Min-mod   L Rotation mod Min-mod   * pain limiting    ROM Hip 8/4/2023 10/30/2023    Flex R 125 124 125   Flex L 125 122 124   ER R 45 50 50   ER L 45 45 50   IR R 40 -3 10   IR L 40 5 20   *pain limiting     MMT 5/5 8/4/2023   L2- hip flex R 5   Hip flex L 5   L3- knee ext R 5   Knee ext L 5   L4- ankle DF R 5   Ankle DF L 5   L5- EHL R 5   EHL L 5   S1- ankle PF R 5   Ankle PF L 5   S2- Hams R 5   Hams L 5   *pain limiting    LE flexibility 8/4/2023 11/6/2023    Piriformis R mod min   Piriformis L mod min   Hams R -62 -50   Hams L -50 -50   *pain limiting    Neural mobility 8/4/2023 10/30/2023    SLR R (+) 40 deg (+) 60 deg   SLR L (+) 45 deg (+) 59 deg      VENKATA 8/4/2023   R (-) wnl ROM   L (-) min ROM loss        Imaging:     PROCEDURE: MRI SPINE LUMBAR (CPT=72148)     COMPARISON: [facility], XR LUMBAR SPINE (MIN 2 VIEWS) (CPT=72100), 5/07/2018, 17:35. [facility], MRI SPINE LUMBAR (TYX=62668), 9/18/2017, 15:07. INDICATIONS: intractable low back pain     TECHNIQUE: A variety of imaging planes and parameters were utilized for visualization of suspected pathology. FINDINGS:  NUMERATION: For the purposes of this examination, the lowest fully formed disc space is designated as L5-S1.  ALIGNMENT: There is preservation of the expected lumbar lordosis. BONES: Probable 2.7 cm hemangioma within S1 vertebral body. Right iliac bone island. Schmorl's node extending into the superior T12 endplate. Degenerative-type edema involving the anterosuperior L3 endplate.   CORD/CAUDA EQUINA: The conus medullaris terminates at the level of the inferior T12 endplate. The distal cord and nerve roots have normal caliber, contour, and signal intensity. PARASPINAL AREA: No visible mass. OTHER: Probable 1.8 cm right lower pole renal cyst.     LUMBAR DISC LEVELS:  L1-L2: No significant disc/facet abnormality, spinal stenosis, or foraminal stenosis. L2-L3: Small diffuse disc bulge, which is slightly eccentric to the left. Mild ligamentum flavum redundancy. No spinal canal or neural foraminal compromise. L3-L4: Minimal post degeneration and desiccation. No significant neural foraminal or spinal canal compromise. L4-L5: There is a diffuse disc bulge with superimposed central disc protrusion/annular fissure. Mild omentum from redundancy and epidural lipomatosis. Mild spinal canal stenosis. Mild to moderate right and mild left lateral recess stenosis. No  significant neural foraminal compromise. L5-S1: No significant disc/facet abnormality, spinal stenosis, or foraminal stenosis. Impression   CONCLUSION:  1. Mild multilevel lumbar spine degenerative changes as detailed. Most notable is a central disc protrusion/annular fissure at L4-L5, which results in mild spinal canal stenosis as well as mild to moderate right and mild left lateral recess stenosis at  this level. No additional significant neural compromise throughout the lumbar spine. 2. Probable hemangioma within the S1 vertebral body, unchanged since September, 2017.  3. Right renal cyst, incompletely characterized on this non-dedicated exam.  4. Lesser incidental findings as above. Dictated by (CST): Dileep Bonner MD on 5/09/2018 at 9:11      Approved by (CST): Dileep Bonner MD on 5/09/2018 at 9:19     PROCEDURE: XR LUMBAR SPINE (MIN 2 VIEWS) (CPT=72100)     COMPARISON: [facility], XR LUMBAR SPINE (MIN 2 VIEWS) (CPT=72100), 12/10/2017, 15:21. INDICATIONS: Atraumatic chronic lower back pain worsening today. TECHNIQUE: Lumbar spine radiographs (4 views)       FINDINGS:     ALIGNMENT: Normal alignment. VERTEBRAL BODIES: There is demineralization of the bony structures. Minimal vertebral body spurring is noted. Otherwise no significant subluxation, fracture or visible bony lesion. DISC SPACES: There is narrowing of  the L4-L5 disc space. Otherwise no significant disc space narrowing. SACROILIAC JOINTS: Normal.    OTHER: Minor vascular calcifications noted. Impression   CONCLUSION:  1. Demineralization. 2. Minimal osteoarthritis. 3. Atherosclerosis. Dictated by (CST): Chasidy Marquez MD on 5/07/2018 at 18:06      Approved by (CST):  Chasidy Marquez MD on 5/07/2018 at 18:08

## 2023-11-07 ENCOUNTER — TELEPHONE (OUTPATIENT)
Age: 33
End: 2023-11-07

## 2023-11-07 DIAGNOSIS — R11.2 NAUSEA AND VOMITING, UNSPECIFIED VOMITING TYPE: Primary | ICD-10-CM

## 2023-11-07 NOTE — TELEPHONE ENCOUNTER
Pt is requesting a returned phone call for medical advice re: stomach issues. This writer verbally encouraged pt to schedule an appt to be seen for any symptoms that she may be experiencing. Pt refused and requested that you contact her.     Thank you

## 2023-11-10 RX ORDER — ONDANSETRON 4 MG/1
4 TABLET, FILM COATED ORAL 3 TIMES DAILY PRN
Qty: 30 TABLET | Refills: 0 | Status: SHIPPED | OUTPATIENT
Start: 2023-11-10

## 2023-11-10 RX ORDER — MECLIZINE HCL 12.5 MG/1
12.5 TABLET ORAL 3 TIMES DAILY PRN
Qty: 15 TABLET | Refills: 0 | Status: SHIPPED | OUTPATIENT
Start: 2023-11-10 | End: 2023-11-20

## 2023-11-10 NOTE — TELEPHONE ENCOUNTER
Returned call to pt. Complaining of episodes of nausea and vertigo, similar to what she experienced last year. Will work on getting her an appointment. Until then, sent Zofran and Meclizine for PRN use.      Becka Witt MD

## 2023-11-13 ENCOUNTER — OFFICE VISIT (OUTPATIENT)
Age: 33
End: 2023-11-13

## 2023-11-13 VITALS
DIASTOLIC BLOOD PRESSURE: 64 MMHG | BODY MASS INDEX: 36.97 KG/M2 | WEIGHT: 215.4 LBS | SYSTOLIC BLOOD PRESSURE: 110 MMHG | HEART RATE: 74 BPM | RESPIRATION RATE: 17 BRPM | OXYGEN SATURATION: 97 %

## 2023-11-13 DIAGNOSIS — E66.09 OTHER OBESITY DUE TO EXCESS CALORIES: ICD-10-CM

## 2023-11-13 DIAGNOSIS — R11.2 NAUSEA AND VOMITING, UNSPECIFIED VOMITING TYPE: ICD-10-CM

## 2023-11-13 DIAGNOSIS — Z23 ENCOUNTER FOR IMMUNIZATION: ICD-10-CM

## 2023-11-13 DIAGNOSIS — G43.009 MIGRAINE WITHOUT AURA AND WITHOUT STATUS MIGRAINOSUS, NOT INTRACTABLE: Primary | ICD-10-CM

## 2023-11-13 LAB
HCG, PREGNANCY, URINE, POC: NEGATIVE
VALID INTERNAL CONTROL, POC: NORMAL

## 2023-11-13 RX ORDER — FERROUS SULFATE 325(65) MG
325 TABLET ORAL
COMMUNITY

## 2023-11-13 RX ORDER — PHENTERMINE HYDROCHLORIDE 15 MG/1
15 CAPSULE ORAL EVERY MORNING
Qty: 45 CAPSULE | Refills: 0 | Status: SHIPPED | OUTPATIENT
Start: 2023-11-13 | End: 2023-12-28

## 2023-11-13 RX ORDER — ACETAMINOPHEN 160 MG
TABLET,DISINTEGRATING ORAL DAILY
COMMUNITY

## 2023-11-13 RX ORDER — SUMATRIPTAN 50 MG/1
TABLET, FILM COATED ORAL
Qty: 9 TABLET | Refills: 0 | Status: SHIPPED | OUTPATIENT
Start: 2023-11-13

## 2023-11-13 SDOH — ECONOMIC STABILITY: HOUSING INSECURITY
IN THE LAST 12 MONTHS, WAS THERE A TIME WHEN YOU DID NOT HAVE A STEADY PLACE TO SLEEP OR SLEPT IN A SHELTER (INCLUDING NOW)?: NO

## 2023-11-13 SDOH — ECONOMIC STABILITY: INCOME INSECURITY: HOW HARD IS IT FOR YOU TO PAY FOR THE VERY BASICS LIKE FOOD, HOUSING, MEDICAL CARE, AND HEATING?: NOT VERY HARD

## 2023-11-13 SDOH — ECONOMIC STABILITY: FOOD INSECURITY: WITHIN THE PAST 12 MONTHS, YOU WORRIED THAT YOUR FOOD WOULD RUN OUT BEFORE YOU GOT MONEY TO BUY MORE.: SOMETIMES TRUE

## 2023-11-13 SDOH — ECONOMIC STABILITY: FOOD INSECURITY: WITHIN THE PAST 12 MONTHS, THE FOOD YOU BOUGHT JUST DIDN'T LAST AND YOU DIDN'T HAVE MONEY TO GET MORE.: SOMETIMES TRUE

## 2023-11-13 ASSESSMENT — PATIENT HEALTH QUESTIONNAIRE - PHQ9
SUM OF ALL RESPONSES TO PHQ QUESTIONS 1-9: 1
SUM OF ALL RESPONSES TO PHQ QUESTIONS 1-9: 1
2. FEELING DOWN, DEPRESSED OR HOPELESS: 1
SUM OF ALL RESPONSES TO PHQ9 QUESTIONS 1 & 2: 1
SUM OF ALL RESPONSES TO PHQ QUESTIONS 1-9: 1
SUM OF ALL RESPONSES TO PHQ QUESTIONS 1-9: 1
1. LITTLE INTEREST OR PLEASURE IN DOING THINGS: 0

## 2023-11-13 ASSESSMENT — ENCOUNTER SYMPTOMS: SHORTNESS OF BREATH: 0

## 2023-11-13 NOTE — PROGRESS NOTES
Identified pt with two pt identifiers(name and ). Reviewed record in preparation for visit and have obtained necessary documentation. Chief Complaint   Patient presents with    Nausea     X couple of weeks, states symptoms have subsided over the last couple of days. Associated with vomiting and abdominal pain         Health Maintenance Due   Topic    Varicella vaccine (1 of 2 - 2-dose childhood series)    HPV vaccine (2 - 3-dose series)    Hepatitis A vaccine (2 of 2 - 2-dose series)    Hepatitis C screen     DTaP/Tdap/Td vaccine (3 - Tdap)    Flu vaccine (1)    COVID-19 Vaccine (3 -  season)       Vitals:    23 1121   Weight: 97.7 kg (215 lb 6.4 oz)           Coordination of Care Questionnaire:  :   1. Have you been to the ER, urgent care clinic since your last visit? Hospitalized since your last visit? No    2. Have you seen or consulted any other health care providers outside of the [address] [place] [address] since your last visit? Include any pap smears or colon screening. No    This patient is accompanied in the office by her self . I have received verbal consent from Su Covington to discuss any/all medical information while they are present in the room.
loss    Bhavesh Jeffers MD    We discussed the expected course, resolution and complications of the diagnosis(es) in detail. Medication risks, benefits, costs, interactions, and alternatives were discussed as indicated. I advised her to contact the office if her condition worsens, changes or fails to improve as anticipated. She expressed understanding with the diagnosis(es) and plan.

## 2023-11-14 LAB
AMPHET UR QL SCN: NEGATIVE
BARBITURATES UR QL SCN: NEGATIVE
BENZODIAZ UR QL: NEGATIVE
CANNABINOIDS UR QL SCN: NEGATIVE
COCAINE UR QL SCN: NEGATIVE
Lab: NORMAL
METHADONE UR QL: NEGATIVE
OPIATES UR QL: NEGATIVE
PCP UR QL: NEGATIVE

## 2024-06-27 ENCOUNTER — OFFICE VISIT (OUTPATIENT)
Age: 34
End: 2024-06-27
Payer: COMMERCIAL

## 2024-06-27 VITALS
HEIGHT: 64 IN | WEIGHT: 207 LBS | TEMPERATURE: 98.8 F | RESPIRATION RATE: 18 BRPM | DIASTOLIC BLOOD PRESSURE: 59 MMHG | BODY MASS INDEX: 35.34 KG/M2 | SYSTOLIC BLOOD PRESSURE: 93 MMHG | OXYGEN SATURATION: 97 % | HEART RATE: 71 BPM

## 2024-06-27 DIAGNOSIS — S23.8XXA SPRAIN OF RHOMBOID, INITIAL ENCOUNTER: Primary | ICD-10-CM

## 2024-06-27 DIAGNOSIS — F41.1 GENERALIZED ANXIETY DISORDER: ICD-10-CM

## 2024-06-27 DIAGNOSIS — M54.9 TRIGGER POINT WITH BACK PAIN: ICD-10-CM

## 2024-06-27 PROCEDURE — 99213 OFFICE O/P EST LOW 20 MIN: CPT | Performed by: STUDENT IN AN ORGANIZED HEALTH CARE EDUCATION/TRAINING PROGRAM

## 2024-06-27 PROCEDURE — 20552 NJX 1/MLT TRIGGER POINT 1/2: CPT | Performed by: STUDENT IN AN ORGANIZED HEALTH CARE EDUCATION/TRAINING PROGRAM

## 2024-06-27 RX ORDER — ESCITALOPRAM OXALATE 10 MG/1
10 TABLET ORAL DAILY
Qty: 60 TABLET | Refills: 0 | Status: SHIPPED | OUTPATIENT
Start: 2024-06-27

## 2024-06-27 RX ORDER — HYDROXYZINE HYDROCHLORIDE 25 MG/1
25 TABLET, FILM COATED ORAL EVERY 8 HOURS PRN
Qty: 30 TABLET | Refills: 0 | Status: SHIPPED | OUTPATIENT
Start: 2024-06-27 | End: 2024-07-07

## 2024-06-27 RX ORDER — LIDOCAINE HYDROCHLORIDE 10 MG/ML
1 INJECTION, SOLUTION INFILTRATION; PERINEURAL ONCE
Status: COMPLETED | OUTPATIENT
Start: 2024-06-27 | End: 2024-06-27

## 2024-06-27 RX ADMIN — LIDOCAINE HYDROCHLORIDE 1 ML: 10 INJECTION, SOLUTION INFILTRATION; PERINEURAL at 09:30

## 2024-06-27 ASSESSMENT — PATIENT HEALTH QUESTIONNAIRE - PHQ9
1. LITTLE INTEREST OR PLEASURE IN DOING THINGS: NOT AT ALL
SUM OF ALL RESPONSES TO PHQ QUESTIONS 1-9: 1
SUM OF ALL RESPONSES TO PHQ9 QUESTIONS 1 & 2: 1
2. FEELING DOWN, DEPRESSED OR HOPELESS: SEVERAL DAYS
SUM OF ALL RESPONSES TO PHQ QUESTIONS 1-9: 1

## 2024-06-27 NOTE — PROGRESS NOTES
[facility]      Chief Complaint:     Chief Complaint   Patient presents with    Back Pain     Patient is coming in for upper back pain. Patient states that the pain feels like it is stabbing. This started about a month ago. Pain is 7 when it flares up. No other concerns.        Michael Carrillo is a 33 y.o. female that presents for: Back pain      Assessment/Plan:   I personally reviewed the following Pertinent Labs/Studies:   - Encounter notes, labs, and imaging from 4/28/24.    Michael was seen today for back pain and anxiety.    Diagnoses and all orders for this visit:    Sprain of rhomboid, initial encounter: tolerated trigger point injection well. Provided home stretches and advised to take Aleve as needed.   -     WA INJECTION SINGLE/MLT TRIGGER POINT 1/2 MUSCLES  -     lidocaine 1 % injection 1 mL    Trigger point with back pain: as above.   -     WA INJECTION SINGLE/MLT TRIGGER POINT 1/2 MUSCLES  -     lidocaine 1 % injection 1 mL    Generalized anxiety disorder: work-related, interfering with sleep. Will start back on SSRI and can use hydroxyzine prn. Follow up in 1 month.   -     escitalopram (LEXAPRO) 10 MG tablet; Take 1 tablet by mouth daily  -     hydrOXYzine HCl (ATARAX) 25 MG tablet; Take 1 tablet by mouth every 8 hours as needed for Anxiety           Follow up:     Return in about 4 weeks (around 7/25/2024) for follow up on anxiety and back pain..     Subjective:   HPI:  Michael Carrillo is a 33 y.o. female that presents for:    One month of upper back pain. Has episodes of sharp, stabbing sensation. Left sided. No injuries to the area. No bruising or swelling. No overlying rash or skin lesion. No radiation up or down the back or into the arm. No numbness, tingling, or extremity weakness. Also wants to discuss anxiety. Has been worrying more about work. Making it difficult to sleep at night. Had previously been on Lexapro and Hydroxyzine which was helpful but patient had

## 2024-06-27 NOTE — PROGRESS NOTES
Michael Carrillo is a 33 y.o. female      Chief Complaint   Patient presents with    Back Pain     Patient is coming in for upper back pain. Patient states that the pain feels like it is stabbing. This started about a month ago. Pain is 7 when it flares up. No other concerns.        \"Have you been to the ER, urgent care clinic since your last visit?  Hospitalized since your last visit?\"    NO    “Have you seen or consulted any other health care providers outside of [facility] since your last visit?”    NO              Vitals:    06/27/24 0830   BP: (!) 93/59   Site: Right Upper Arm   Position: Sitting   Pulse: 71   Resp: 18   Temp: 98.8 °F (37.1 °C)   TempSrc: Oral   SpO2: 97%   Weight: 93.9 kg (207 lb)   Height: 1.626 m (5' 4\")            Health Maintenance Due   Topic Date Due    Varicella vaccine (1 of 2 - 2-dose childhood series) Never done    HPV vaccine (2 - 3-dose series) 08/07/2007    Hepatitis A vaccine (2 of 2 - 2-dose series) 01/10/2008    Hepatitis C screen  Never done    DTaP/Tdap/Td vaccine (3 - Tdap) 02/17/2023    COVID-19 Vaccine (3 - 2023-24 season) 09/01/2023         Medication Reconciliation completed, changes noted.  Please  Update medication list.     normal...

## 2024-08-22 DIAGNOSIS — G43.009 MIGRAINE WITHOUT AURA AND WITHOUT STATUS MIGRAINOSUS, NOT INTRACTABLE: ICD-10-CM

## 2024-08-22 NOTE — TELEPHONE ENCOUNTER
Medication Refill Request    Michael Carrillo is requesting a refill of the following medication(s):   Requested Prescriptions     Pending Prescriptions Disp Refills    SUMAtriptan (IMITREX) 50 MG tablet [Pharmacy Med Name: SUMAtriptan Succinate 50 MG Oral Tablet] 9 tablet 0     Sig: TAKE 1 AT ONSET OF MIGRANE ,MAY REPEAT ANOTHER ONE IN 2 HRS IF MIGRANCE IS STILL PRESENT,MAX OF 2/24 HRS.        Listed PCP is Ani Francois MD   Last provider to prescribe medication: Dr. Francois   Last Date of Medication Prescribed: 11/23/2023   Date of Last Office Visit at [facility]: 06/27/2024     Future Appointment: No future appointments.    Please send refill to:    [facility] [address] [place] [address] [address] [place] [address] -  [phone] - F [phone]  [address] [facility] [address]  Phone: [phone] Fax: [phone]

## 2024-08-23 RX ORDER — SUMATRIPTAN 50 MG/1
TABLET, FILM COATED ORAL
Qty: 9 TABLET | Refills: 0 | Status: SHIPPED | OUTPATIENT
Start: 2024-08-23